# Patient Record
Sex: MALE | Race: WHITE | NOT HISPANIC OR LATINO | Employment: FULL TIME | URBAN - METROPOLITAN AREA
[De-identification: names, ages, dates, MRNs, and addresses within clinical notes are randomized per-mention and may not be internally consistent; named-entity substitution may affect disease eponyms.]

---

## 2018-04-04 ENCOUNTER — TRANSFERRED RECORDS (OUTPATIENT)
Dept: HEALTH INFORMATION MANAGEMENT | Facility: CLINIC | Age: 33
End: 2018-04-04

## 2018-04-23 ENCOUNTER — TRANSFERRED RECORDS (OUTPATIENT)
Dept: HEALTH INFORMATION MANAGEMENT | Facility: CLINIC | Age: 33
End: 2018-04-23

## 2018-05-06 ENCOUNTER — PRE VISIT (OUTPATIENT)
Dept: ORTHOPEDICS | Facility: CLINIC | Age: 33
End: 2018-05-06

## 2018-05-06 NOTE — TELEPHONE ENCOUNTER
FUTURE VISIT INFORMATION      FUTURE VISIT INFORMATION:    Date: 5/7/18    Time:     Location:   REFERRAL INFORMATION:    Referring provider:  Lisa Hilliard    Referring providers clinic:  [facility]    Reason for visit/diagnosis  Rt knee pain    RECORDS REQUESTED FROM:

## 2018-05-07 ENCOUNTER — OFFICE VISIT (OUTPATIENT)
Dept: ORTHOPEDICS | Facility: CLINIC | Age: 33
End: 2018-05-07
Payer: COMMERCIAL

## 2018-05-07 VITALS — HEIGHT: 71 IN | WEIGHT: 170 LBS | BODY MASS INDEX: 23.8 KG/M2

## 2018-05-07 DIAGNOSIS — M25.561 RIGHT KNEE PAIN, UNSPECIFIED CHRONICITY: Primary | ICD-10-CM

## 2018-05-07 NOTE — MR AVS SNAPSHOT
"              After Visit Summary   5/7/2018    Gus Guerrero    MRN: [id number]           Patient Information     Date Of Birth          1985        Visit Information        Provider Department      5/7/2018 7:50 AM Tree Omalley MD [facility]        Today's Diagnoses     Right knee pain, unspecified chronicity    -  1       Follow-ups after your visit        Additional Services     PHYSICAL THERAPY REFERRAL (Internal)       Physical Therapy Referral                  Who to contact     Please call your clinic at [phone] to:    Ask questions about your health    Make or cancel appointments    Discuss your medicines    Learn about your test results    Speak to your doctor            Additional Information About Your Visit        MyChart Information     [email or web address] gives you secure access to your electronic health record. If you see a primary care provider, you can also send messages to your care team and make appointments. If you have questions, please call your primary care clinic.  If you do not have a primary care provider, please call [phone] and they will assist you.      [email or web address] is an electronic gateway that provides easy, online access to your medical records. With [email or web address], you can request a clinic appointment, read your test results, renew a prescription or communicate with your care team.     To access your existing account, please contact your [facility] or call [phone] for assistance.        Care EveryWhere ID     This is your Care EveryWhere ID. This could be used by other organizations to access your [place] medical records  [id number]        Your Vitals Were     Height BMI (Body Mass Index)                1.803 m (5' 11\") 23.71 kg/m2           Blood Pressure from Last 3 Encounters:   08/21/15 125/81   08/15/14 117/78   07/22/14 118/80    Weight from Last 3 Encounters:   05/07/18 77.1 kg (170 lb)   08/21/15 74.8 kg (165 lb) "   08/21/15 75 kg (165 lb 6.4 oz)              We Performed the Following     PHYSICAL THERAPY REFERRAL (Internal)        Primary Care Provider Office Phone # Fax #    Harsha Menon [phone]       [facility] [address]  [facility] [address]        Equal Access to Services     [facility]: Hadii aad ku hadasho Soomaali, waaxda luqadaha, qaybta kaalmada adeegyada, waxay idiin hayaan adeeg kharash la'aan . So [facility] [phone].    ATENCIÓN: Si habla español, tiene a talbert disposición servicios gratuitos de asistencia lingüística. Oumar al [phone].    We comply with applicable federal civil rights laws and [place] laws. We do not discriminate on the basis of race, color, national origin, age, disability, sex, sexual orientation, or gender identity.            Thank you!     Thank you for choosing [facility]  for your care. Our goal is always to provide you with excellent care. Hearing back from our patients is one way we can continue to improve our services. Please take a few minutes to complete the written survey that you may receive in the mail after your visit with us. Thank you!             Your Updated Medication List - Protect others around you: Learn how to safely use, store and throw away your medicines at www.disposemymeds.org.          This list is accurate as of 5/7/18  9:06 AM.  Always use your most recent med list.                   Brand Name Dispense Instructions for use Diagnosis    ATIVAN 1 MG tablet   Generic drug:  LORazepam      Take 1 mg by mouth every 6 hours as needed.        citalopram 20 MG tablet    celeXA     Take 20 mg by mouth daily.        vitamin D 2000 units tablet      1 TABLET DAILY

## 2018-05-07 NOTE — NURSING NOTE
Reason For Visit:   Chief Complaint   Patient presents with     Consult     Right knee       ?  No  Occupation:   Currently working? Yes.  Work status?  Full time.  Date of injury: NA, bothersome for 4 weeks  Type of injury: NA.  Date of surgery: NA  Type of surgery: NA.  Smoker: No  Request smoking cessation information: No    Pain Assessment  Patient Currently in Pain: Yes  0-10 Pain Scale: 2  Primary Pain Location: Knee  Pain Orientation: Right

## 2018-05-07 NOTE — LETTER
5/7/2018       RE: Gus Guerrero  [address]  [facility] [address]     Dear Colleague,    Thank you for referring your patient, Gus Guerrero, to the [facility]TH[facility]IC at [facility]. Please see a copy of my visit note below.    CHIEF CONCERN: Right knee pain    HISTORY:   Pleasant 33-year-old male with a complex history regarding his knees.  He was previously diagnosed with acute lymphoblastic leukemia at approximately 14 years of age he underwent chemotherapy for this though he did have recurrence of his disease approximately 2 years later during the course of this time he had episodes of high dose steroids as well as radiation including full body radiation.  He had an area of avascular necrosis of his left knee treated with osteochondral allograft transplantation a 25 and an 18 mm plug this was done in 2009 this was done in the left knee and is been pretty happy with it.  He has been able to do things he wants to do it he describes a SANE score of 80.    In general he has not had problems with his right knee at all until approximately 4 weeks ago when he noted the onset of right knee pain.  It actually was very painful both 4 and 3 weeks ago and had really limited him and what he wants to do.  It bothered him the point where he was really having difficulty walking on it.  He got x-rays and MRI.  He presents to my clinic to discuss.  He admits that it has been better for the last 2 weeks.  He has not had an injection.  He has a SANE score of 75.    PAST MEDICAL HISTORY: (Reviewed with the patient and in the [facility] medical record)  1. Acute lymphoblastic leukemia as described above with a recurrence  2. History of high-dose steroids as well as radiation    PAST SURGICAL HISTORY: (Reviewed with the patient and in the [facility] medical record)  1. Osteochondral allograft transplantation medial femoral condyle left knee.  Doing well.  SANE score of 80    MEDICATIONS:  (Reviewed with the patient and in the [facility] medical record)    ALLERGIES: (Reviewed with the patient and in the [facility] medical record)  1. Biaxin      SOCIAL HISTORY: (Reviewed with the patient and in the medical record)  --Tobacco: None  --Occupation: He works as a   --Avocation/Sport: Enjoys spending time with friends, watching movies and being active.    FAMILY HISTORY: (Reviewed with the patient and in the medical record)  -- No family history of bleeding, clotting, or difficulty with anesthesia        REVIEW OF SYSTEMS: (Reviewed with the patient and on the health intake form)  -- A comprehensive 10 point review of systems was conducted and is negative except as noted in the HPI    EXAM:     General: Awake alert and oriented no acute distress    Body mass index is 23.71 kg/(m^2).    Left lower extremity :    Skin is intact with well-healed surgical incisions    No effusion    No tenderness    Full motion    No skin changes from the radiation that he experienced to this area, some atrophy    EHL/FHL/TA/GS 5/5    Sensation intact L3-S1    2+ Dorsalis Pedis Pulse     Right lower extremity:    Skin is intact with well-healed surgical incisions    1+ effusion    Medial and lateral diffuse tenderness no focal tenderness    Range of motion is 5-125.    Stable to varus valgus stress testing stable intra-posterior drawer testing    EHL/FHL/TA/GS 5/5    Sensation intact L3-S1    2+ Dorsalis Pedis Pulse      IMAGING:    Plain Radiographs: Plain films show evidence of prior avascular necrosis.    MRI: MRI shows a 1.5 to approximately 1.8 cm area of abnormal cartilage over the flexion zone of the medial femoral condyle this is consistent with avascular necrosis    ASSESSMENT:  1. Acute lymphoblastic leukemia, currently in remission  2. 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  Doing well.  3. Right knee pain and swelling for 4 weeks, seems to be  "improving    PLAN:  1. At this time we will start him on a scheduled naproxen as well as a course of physical therapy.  We will see how this does.  2. If he does not see lasting improvement he will return to my clinic for consideration of a corticosteroid injection  3. If this does not provide lasting benefit I think a staging arthroscopy to remove any unstable cartilage flaps, get the \"lay of the land\" would be a very reasonable plan that would fully he may require osteochondral allograft transplantation the medial femoral condyle on this knee if his remain symptomatic.  4. Follow with me as needed          Again, thank you for allowing me to participate in the care of your patient.      Sincerely,    Tree Omalley MD      "

## 2018-05-07 NOTE — PROGRESS NOTES
CHIEF CONCERN: Right knee pain    HISTORY:   Pleasant 33-year-old male with a complex history regarding his knees.  He was previously diagnosed with acute lymphoblastic leukemia at approximately 14 years of age he underwent chemotherapy for this though he did have recurrence of his disease approximately 2 years later during the course of this time he had episodes of high dose steroids as well as radiation including full body radiation.  He had an area of avascular necrosis of his left knee treated with osteochondral allograft transplantation a 25 and an 18 mm plug this was done in 2009 this was done in the left knee and is been pretty happy with it.  He has been able to do things he wants to do it he describes a SANE score of 80.    In general he has not had problems with his right knee at all until approximately 4 weeks ago when he noted the onset of right knee pain.  It actually was very painful both 4 and 3 weeks ago and had really limited him and what he wants to do.  It bothered him the point where he was really having difficulty walking on it.  He got x-rays and MRI.  He presents to my clinic to discuss.  He admits that it has been better for the last 2 weeks.  He has not had an injection.  He has a SANE score of 75.    PAST MEDICAL HISTORY: (Reviewed with the patient and in the EPIC medical record)  1. Acute lymphoblastic leukemia as described above with a recurrence  2. History of high-dose steroids as well as radiation    PAST SURGICAL HISTORY: (Reviewed with the patient and in the EPIC medical record)  1. Osteochondral allograft transplantation medial femoral condyle left knee.  Doing well.  SANE score of 80    MEDICATIONS: (Reviewed with the patient and in the EPIC medical record)    ALLERGIES: (Reviewed with the patient and in the EP[facility] medical record)  1. Biaxin      SOCIAL HISTORY: (Reviewed with the patient and in the medical record)  --Tobacco: None  --Occupation: He works as a financial  analyst  --Avocation/Sport: Enjoys spending time with friends, watching movies and being active.    FAMILY HISTORY: (Reviewed with the patient and in the medical record)  -- No family history of bleeding, clotting, or difficulty with anesthesia        REVIEW OF SYSTEMS: (Reviewed with the patient and on the health intake form)  -- A comprehensive 10 point review of systems was conducted and is negative except as noted in the HPI    EXAM:     General: Awake alert and oriented no acute distress    Body mass index is 23.71 kg/(m^2).    Left lower extremity :    Skin is intact with well-healed surgical incisions    No effusion    No tenderness    Full motion    No skin changes from the radiation that he experienced to this area, some atrophy    EHL/FHL/TA/GS 5/5    Sensation intact L3-S1    2+ Dorsalis Pedis Pulse     Right lower extremity:    Skin is intact with well-healed surgical incisions    1+ effusion    Medial and lateral diffuse tenderness no focal tenderness    Range of motion is 5-125.    Stable to varus valgus stress testing stable intra-posterior drawer testing    EHL/FHL/TA/GS 5/5    Sensation intact L3-S1    2+ Dorsalis Pedis Pulse      IMAGING:    Plain Radiographs: Plain films show evidence of prior avascular necrosis.    MRI: MRI shows a 1.5 to approximately 1.8 cm area of abnormal cartilage over the flexion zone of the medial femoral condyle this is consistent with avascular necrosis    ASSESSMENT:  1. Acute lymphoblastic leukemia, currently in remission  2. 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  Doing well.  3. Right knee pain and swelling for 4 weeks, seems to be improving    PLAN:  1. At this time we will start him on a scheduled naproxen as well as a course of physical therapy.  We will see how this does.  2. If he does not see lasting improvement he will return to my clinic for consideration of a corticosteroid injection  3. If this  "does not provide lasting benefit I think a staging arthroscopy to remove any unstable cartilage flaps, get the \"lay of the land\" would be a very reasonable plan that would fully he may require osteochondral allograft transplantation the medial femoral condyle on this knee if his remain symptomatic.  4. Follow with me as needed        "

## 2018-05-18 ENCOUNTER — THERAPY VISIT (OUTPATIENT)
Dept: PHYSICAL THERAPY | Facility: CLINIC | Age: 33
End: 2018-05-18
Payer: COMMERCIAL

## 2018-05-18 DIAGNOSIS — M25.561 ACUTE PAIN OF RIGHT KNEE: Primary | ICD-10-CM

## 2018-05-18 PROCEDURE — 97110 THERAPEUTIC EXERCISES: CPT | Mod: GP | Performed by: PHYSICAL THERAPIST

## 2018-05-18 PROCEDURE — 97161 PT EVAL LOW COMPLEX 20 MIN: CPT | Mod: GP | Performed by: PHYSICAL THERAPIST

## 2018-05-18 ASSESSMENT — ACTIVITIES OF DAILY LIVING (ADL)
SWELLING: I HAVE THE SYMPTOM BUT IT DOES NOT AFFECT MY ACTIVITY
GO UP STAIRS: ACTIVITY IS NOT DIFFICULT
PAIN: I DO NOT HAVE THE SYMPTOM
WALK: ACTIVITY IS MINIMALLY DIFFICULT
HOW_WOULD_YOU_RATE_THE_CURRENT_FUNCTION_OF_YOUR_KNEE_DURING_YOUR_USUAL_DAILY_ACTIVITIES_ON_A_SCALE_FROM_0_TO_100_WITH_100_BEING_YOUR_LEVEL_OF_KNEE_FUNCTION_PRIOR_TO_YOUR_INJURY_AND_0_BEING_THE_INABILITY_TO_PERFORM_ANY_OF_YOUR_USUAL_DAILY_ACTIVITIES?: 90
GIVING WAY, BUCKLING OR SHIFTING OF KNEE: I DO NOT HAVE THE SYMPTOM
KNEE_ACTIVITY_OF_DAILY_LIVING_SCORE: 85.71
AS_A_RESULT_OF_YOUR_KNEE_INJURY,_HOW_WOULD_YOU_RATE_YOUR_CURRENT_LEVEL_OF_DAILY_ACTIVITY?: NEARLY NORMAL
SIT WITH YOUR KNEE BENT: ACTIVITY IS NOT DIFFICULT
RAW_SCORE: 60
LIMPING: I DO NOT HAVE THE SYMPTOM
HOW_WOULD_YOU_RATE_THE_OVERALL_FUNCTION_OF_YOUR_KNEE_DURING_YOUR_USUAL_DAILY_ACTIVITIES?: NEARLY NORMAL
WEAKNESS: I DO NOT HAVE THE SYMPTOM
GO DOWN STAIRS: ACTIVITY IS MINIMALLY DIFFICULT
KNEE_ACTIVITY_OF_DAILY_LIVING_SUM: 60
STIFFNESS: I DO NOT HAVE THE SYMPTOM
SQUAT: ACTIVITY IS SOMEWHAT DIFFICULT
STAND: ACTIVITY IS NOT DIFFICULT
RISE FROM A CHAIR: ACTIVITY IS SOMEWHAT DIFFICULT
KNEEL ON THE FRONT OF YOUR KNEE: ACTIVITY IS FAIRLY DIFFICULT

## 2018-05-18 NOTE — MR AVS SNAPSHOT
After Visit Summary   5/18/2018    Gus Guerrero    MRN: [id number]           Patient Information     Date Of Birth          1985        Visit Information        Provider Department      5/18/2018 4:30 PM Jaciel Garnett, PT [facility] Physical Therapy        Today's Diagnoses     Acute pain of right knee    -  1       Follow-ups after your visit        Who to contact     If you have questions or need follow up information about today's clinic visit or your schedule please contact [facility] ATHLETIC [facility] PHYSICAL [facility] directly at [phone].  Normal or non-critical lab and imaging results will be communicated to you by [employer], letter or phone within 4 business days after the clinic has received the results. If you do not hear from us within 7 days, please contact the clinic through [employer] or phone. If you have a critical or abnormal lab result, we will notify you by phone as soon as possible.  Submit refill requests through [employer] or call your pharmacy and they will forward the refill request to us. Please allow 3 business days for your refill to be completed.          Additional Information About Your Visit        MyChart Information     [employer] gives you secure access to your electronic health record. If you see a primary care provider, you can also send messages to your care team and make appointments. If you have questions, please call your primary care clinic.  If you do not have a primary care provider, please call [phone] and they will assist you.        Care EveryWhere ID     This is your Care EveryWhere ID. This could be used by other organizations to access your [place] medical records  [id number]         Blood Pressure from Last 3 Encounters:   08/21/15 125/81   08/15/14 117/78   07/22/14 118/80    Weight from Last 3 Encounters:   05/07/18 77.1 kg (170 lb)   08/21/15 74.8 kg (165 lb)   08/21/15 75 kg (165 lb 6.4  oz)              We Performed the Following     HC PT EVAL, LOW COMPLEXITY     HINA INITIAL EVAL REPORT     THERAPEUTIC EXERCISES        Primary Care Provider Office Phone # Fax #    Harsha Menon [phone]       [facility] [address]  [facility] [address]        Equal Access to Services     [facility] GIOVANNA : Hadii aad ku hadasho Soomaali, waaxda luqadaha, qaybta kaalmada adeegyada, waxay idiin hayaan adeeg khannemariesh la'aan . So [facility] [phone].    ATENCIÓN: Si habla español, tiene a talbert disposición servicios gratuitos de asistencia lingüística. [facility] [phone].    We comply with applicable federal civil rights laws and [place] laws. We do not discriminate on the basis of race, color, national origin, age, disability, sex, sexual orientation, or gender identity.            Thank you!     Thank you for choosing [place] FOR ATHLETIC MEDICINE [facility] PHYSICAL THERAPY  for your care. Our goal is always to provide you with excellent care. Hearing back from our patients is one way we can continue to improve our services. Please take a few minutes to complete the written survey that you may receive in the mail after your visit with us. Thank you!             Your Updated Medication List - Protect others around you: Learn how to safely use, store and throw away your medicines at www.disposemymeds.org.          This list is accurate as of 5/18/18  5:11 PM.  Always use your most recent med list.                   Brand Name Dispense Instructions for use Diagnosis    ATIVAN 1 MG tablet   Generic drug:  LORazepam      Take 1 mg by mouth every 6 hours as needed.        citalopram 20 MG tablet    celeXA     Take 20 mg by mouth daily.        vitamin D 2000 units tablet      1 TABLET DAILY

## 2018-05-18 NOTE — PROGRESS NOTES
[facility] Initial Evaluation  Subjective:  Patient is a 33 year old male presenting with rehab right knee hpi.   Gus Guerrero is a 33 year old male with a right knee condition.    Condition occurred: for unknown reasons.  This is a new condition  Pt reports that about eight weeks ago started noticing popping in his R knee around early April 2018. Knee began to swell and felt stiff and sore. Went into urgent care. Was concerned about recurrence of lymphoblastic leukemia which had previously affected his L knee. Pain in R got to the point where he had to be on crutches. Pain has decreased a bit, but saw Dr. Omalley, discussed MRI that showed avascular necrosis and discussed options. Trying PT first. Currently reports few symptoms, maybe a little stiffness descending stairs. .    Patient reports pain:  Anterior, medial and in the joint.    Pain is described as sharp and aching and is intermittent and reported as 1/10.   Pain is the same all the time.  Symptoms are exacerbated by activity and descending stairs and relieved by rest.  Since onset symptoms are gradually improving.  Special tests:  MRI and x-ray.      General health as reported by patient is excellent.  Pertinent medical history includes:  Cancer and high blood pressure.    Other surgeries include:  Cancer surgery ([id number] acute lymphblastic leukemia, 2009 L knee graft).  Current medications:  Anti-depressants.  Current occupation is .    Primary job tasks include:  Prolonged sitting, prolonged standing, lifting and repetitive tasks.    Barriers include:  None as reported by the patient.    Red flags:  None as reported by the patient.                        Objective:  System                                                Knee Evaluation:  ROM:  Strength:  Normal  AROM    Hyperextension: Left:  7    Right:  Extension: Left:    Right:  4  Flexion: Left: 133    Right: 131            Special Tests: Not  Assessed            Functional Testing:          Quad:    Single Leg Squat:  Left:      Right:        Bilateral Leg Squat:   Excessive anterior knee excursion and decreased hip/trunk flexion              General     ROS    Assessment/Plan:    Patient is a 33 year old male with right side knee complaints.    Patient has the following significant findings with corresponding treatment plan.                Diagnosis 1:  R knee pain/avascular necrosis  Pain -  self management, education and home program  Decreased ROM/flexibility - manual therapy, therapeutic exercise, therapeutic activity and home program  Decreased joint mobility - manual therapy, therapeutic exercise, therapeutic activity and home program  Decreased proprioception - neuro re-education, therapeutic activities and home program  Inflammation - self management/home program  Decreased function - therapeutic activities and home program    Therapy Evaluation Codes:   1) History comprised of:   Personal factors that impact the plan of care:      None.    Comorbidity factors that impact the plan of care are:      None.     Medications impacting care: None.  2) Examination of Body Systems comprised of:   Body structures and functions that impact the plan of care:      Knee.   Activity limitations that impact the plan of care are:      Stairs.  3) Clinical presentation characteristics are:   Evolving/Changing.  4) Decision-Making    Low complexity using standardized patient assessment instrument and/or measureable assessment of functional outcome.  Cumulative Therapy Evaluation is: Low complexity.    Previous and current functional limitations:  (See Goal Flow Sheet for this information)    Short term and Long term goals: (See Goal Flow Sheet for this information)     Communication ability:  Patient appears to be able to clearly communicate and understand verbal and written communication and follow directions correctly.  Treatment Explanation - The following has  been discussed with the patient:   RX ordered/plan of care  Anticipated outcomes  Possible risks and side effects  This patient would benefit from PT intervention to resume normal activities.   Rehab potential is excellent.    Frequency:  1 X week, once daily  Duration:  for 6 weeks  Discharge Plan:  Achieve all LTG.  Independent in home treatment program.  Reach maximal therapeutic benefit.    Please refer to the daily flowsheet for treatment today, total treatment time and time spent performing 1:1 timed codes.

## 2018-07-20 PROBLEM — M25.561 ACUTE PAIN OF RIGHT KNEE: Status: RESOLVED | Noted: 2018-05-18 | Resolved: 2018-07-20

## 2019-07-09 DIAGNOSIS — Z91.89 AT RISK FOR CARDIOMYOPATHY: ICD-10-CM

## 2019-07-09 DIAGNOSIS — Z92.3 HISTORY OF RADIATION THERAPY: ICD-10-CM

## 2019-07-09 DIAGNOSIS — Z85.6 HISTORY OF ACUTE LYMPHOID LEUKEMIA: Primary | ICD-10-CM

## 2019-07-09 DIAGNOSIS — Z92.21 STATUS POST CHEMOTHERAPY: ICD-10-CM

## 2019-07-09 DIAGNOSIS — Z94.81 STATUS POST BONE MARROW TRANSPLANT (H): ICD-10-CM

## 2019-08-09 ENCOUNTER — TRANSFERRED RECORDS (OUTPATIENT)
Dept: HEALTH INFORMATION MANAGEMENT | Facility: CLINIC | Age: 34
End: 2019-08-09

## 2019-08-27 ENCOUNTER — ANCILLARY PROCEDURE (OUTPATIENT)
Dept: CARDIOLOGY | Facility: CLINIC | Age: 34
End: 2019-08-27
Attending: NURSE PRACTITIONER
Payer: COMMERCIAL

## 2019-08-27 ENCOUNTER — OFFICE VISIT (OUTPATIENT)
Dept: PEDIATRIC HEMATOLOGY/ONCOLOGY | Facility: CLINIC | Age: 34
End: 2019-08-27
Attending: NURSE PRACTITIONER
Payer: COMMERCIAL

## 2019-08-27 VITALS
OXYGEN SATURATION: 100 % | DIASTOLIC BLOOD PRESSURE: 78 MMHG | HEIGHT: 70 IN | HEART RATE: 67 BPM | BODY MASS INDEX: 24.65 KG/M2 | WEIGHT: 172.18 LBS | RESPIRATION RATE: 20 BRPM | SYSTOLIC BLOOD PRESSURE: 128 MMHG

## 2019-08-27 DIAGNOSIS — Z92.3 HISTORY OF RADIATION THERAPY: ICD-10-CM

## 2019-08-27 DIAGNOSIS — Z85.6 HISTORY OF ACUTE LYMPHOID LEUKEMIA: ICD-10-CM

## 2019-08-27 DIAGNOSIS — Z94.81 STATUS POST BONE MARROW TRANSPLANT (H): ICD-10-CM

## 2019-08-27 DIAGNOSIS — Z91.89 AT RISK FOR CARDIOMYOPATHY: ICD-10-CM

## 2019-08-27 DIAGNOSIS — Z92.21 STATUS POST CHEMOTHERAPY: ICD-10-CM

## 2019-08-27 DIAGNOSIS — Z85.6 HISTORY OF ACUTE LYMPHOID LEUKEMIA: Primary | ICD-10-CM

## 2019-08-27 LAB
ALBUMIN SERPL-MCNC: 4.1 G/DL (ref 3.4–5)
ALP SERPL-CCNC: 96 U/L (ref 40–150)
ALT SERPL W P-5'-P-CCNC: 29 U/L (ref 0–70)
ANION GAP SERPL CALCULATED.3IONS-SCNC: 7 MMOL/L (ref 3–14)
AST SERPL W P-5'-P-CCNC: 16 U/L (ref 0–45)
BILIRUB SERPL-MCNC: 0.8 MG/DL (ref 0.2–1.3)
BUN SERPL-MCNC: 20 MG/DL (ref 7–30)
CALCIUM SERPL-MCNC: 9.7 MG/DL (ref 8.5–10.1)
CHLORIDE SERPL-SCNC: 107 MMOL/L (ref 94–109)
CO2 SERPL-SCNC: 25 MMOL/L (ref 20–32)
CREAT SERPL-MCNC: 1.16 MG/DL (ref 0.66–1.25)
GFR SERPL CREATININE-BSD FRML MDRD: 83 ML/MIN/{1.73_M2}
GLUCOSE SERPL-MCNC: 86 MG/DL (ref 70–99)
POTASSIUM SERPL-SCNC: 3.6 MMOL/L (ref 3.4–5.3)
PROT SERPL-MCNC: 7.8 G/DL (ref 6.8–8.8)
SODIUM SERPL-SCNC: 139 MMOL/L (ref 133–144)
T4 FREE SERPL-MCNC: 0.86 NG/DL (ref 0.76–1.46)
TSH SERPL DL<=0.005 MIU/L-ACNC: 1.82 MU/L (ref 0.4–4)

## 2019-08-27 PROCEDURE — 36415 COLL VENOUS BLD VENIPUNCTURE: CPT | Performed by: NURSE PRACTITIONER

## 2019-08-27 PROCEDURE — 84439 ASSAY OF FREE THYROXINE: CPT | Performed by: NURSE PRACTITIONER

## 2019-08-27 PROCEDURE — 80053 COMPREHEN METABOLIC PANEL: CPT | Performed by: NURSE PRACTITIONER

## 2019-08-27 PROCEDURE — 84443 ASSAY THYROID STIM HORMONE: CPT | Performed by: NURSE PRACTITIONER

## 2019-08-27 PROCEDURE — G0463 HOSPITAL OUTPT CLINIC VISIT: HCPCS | Mod: ZF

## 2019-08-27 RX ORDER — ASPIRIN 81 MG/1
81 TABLET ORAL DAILY
COMMUNITY

## 2019-08-27 ASSESSMENT — MIFFLIN-ST. JEOR: SCORE: 1740.38

## 2019-08-27 ASSESSMENT — PAIN SCALES - GENERAL: PAINLEVEL: NO PAIN (0)

## 2019-08-27 NOTE — LETTER
8/27/2019      RE: Gus Guerrero  [address]  [facility] [address]       We had the pleasure of seeing your patient, Gus Guerrero, at the [facility] for childhood cancer survivors. The following is a summary of your patient's cancer, therapy, current history and physical findings followed by an assessment and long-term follow-up evaluation.   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 He was high risk due to his age. He was treated at the [facility] on Protocol [id number] Regimen A and completed his initial therapy on May 6, 2002. He received the following chemotherapy for his initial regimen.   1. Cyclophosphamide intravenously with a cumulative dose of 3 gm/M2   2. Cytarabine intravenously with a cumulative dose of 2100 mg/M2   3. Cytarabine intrathecally with a cumulative dose of 47 mg/M2   4. Daunorubicin intravenously with a cumulative dose of 100 mg/M2 (anthracycline equivalent 83 mg/M2)   5. Dexamethasone orally   6. Doxorubicin intravenously with a cumulative dose of 75 mg/M2   7. L-asparaginase intramuscularly   8. 6-mercaptopurine orally   9. Methotrexate orally   10. Methotrexate intrathecally with a cumulative dose of 192 mg/M2   11. Prednisone orally   12. 6-thioguanine orally   13. Vincristine intravenously   Unfortunately, Gus was found to have a recurrence of his disease approximately two years after therapy was completed on March 24, 2004 to his bone marrow. He was treated with a relapse regimen [id number] Regimen A which was completed with his bone marrow transplantation. He received the following chemotherapy on Regimen [id number].   1. Cyclophosphamide intravenously with a cumulative dose of 2.2 gm/M2   2. Cytarabine intravenously with a cumulative dose of 24 g/M2   3. Cytarabine  intrathecally with a cumulative dose of 41 mg/M2   4. Doxorubicin intravenously with a cumulative dose of 60 mg/M2   5. L-asparaginase intramuscularly   6. Methotrexate intravenously with a cumulative dose of 5 g/M2   7. Methotrexate intrathecally with a cumulative dose of 28 mg/M2   8. PEG asparaginase intramuscularly   9. Prednisone orally   10. Vincristine intravenously   11. 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 He received the following conditioning regimen for his transplant:   1. Cyclophosphamide intravenously with a cumulative dose of 4371 mg/M2   2. Fludarabine intravenously with a cumulative dose of 78 mg/M2   3. Total body irradiation which started on July 24, 2004 and was completed on July 27, 2004 in eight fractions at 165 cGy/fraction for a total dose of 1320 cGy.   4. Testicular boost completed on July 24, 2004 in two fractions at 200 cGy/fraction for a total dose of 400 cGy.   Gus had GVHD prophylaxis with cyclosporin and MMF and steroids. He was off all immune suppression in June of 2005.   Gus's acute and chronic late effects known to date include:   1. 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   2. 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   3. Mild air trapping via PFTs July 2005 with no current symptoms.   4. High triglycerides found in February 2008.   5. Problems with sustained attention, executive functioning and processing speed initially seen in August of 2005 which were improved on recent neuropsychological testing in 2009.   6. Osteonecrosis of the left medial femoral condyle found on May 19, 2008.   7. Compound nevus resected on September 5, 2008.   8.  Anxiety    Current Medications:   Current Outpatient Medications   Medication     aspirin 81 MG EC tablet     citalopram (CELEXA) 20 MG tablet     VITAMIN  D 2000 UNIT PO TABS     LORazepam (ATIVAN) 1 MG tablet     No current facility-administered medications for this visit.      Gus has medication allergies to Bactrim, Biaxin, Serevent, Nuts, and Dapsone    History of Present Illness: Gus presents to the visit today alone.  His last visit with us was in August 2015.  He has been doing well since his last visit.  He has a 2 year old son now after fertility treatments in [place].  They are going back to try for a second child in the fall of this year.     No recent medical issues.   He wears sunscreen regularly.    No problems with his vision.  He sees the eye dr every 2 years for cataract follow up.   He sees orthopedics every other year for his left knee AVN.  He was seen last year by Dr. Omalley when he had a knee pain exacerbation.  He started PT and OTC meds and this pain resolved.       No dry skin, mouth, or eyes.  He is on Celexa 10 mg every day for anxiety.  He feels like it is helping.  He is no longer having anxiety attack symptoms like he was before.  No significant illnesses or fevers.  Gets the flu vaccine every year.  Has been seen by derm yearly. Had a dysplastic mole removed from his back last year.  Seeing them later today.     Review of systems:    General:  No acute distress  Skin: history of dysplastic mole.  Eyes: cataracts; followed  by eye   Ears/Nose/Throat: negative  Respiratory: No SOB or increased WOB; no cough or wheezing  Cardiovascular: negative for palpitations or chest pain  Gastrointestinal: negative; no pain; No N/V/D/C  Genitourinary: negative  Musculoskeletal: left knee osteonecrosis s/p surgery 2009  Neurologic: negative  Psychiatric:  Anxiety- improved  Hematologic/Lymphatic/Immunologic: negative  Endocrine: negative    Social History: Gus completed his undergraduate degree at [employer] and was accepted to the [employer] for a combined master's degree in health policy administration and business  "administration.  He has completed his Masters degree.  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    Gus is recently .  He denies any risky behaviors in the area of tobacco or alcohol or recreational drug use. He has banked sperm.      Family History: Gus's paternal aunt has a history of basal cell carcinoma. Paternal grandmother with a history of ovarian cancer at the age of 81. Paternal grandfather also with skin cancer in the area of basal cell carcinoma. Gus's father has a history of hypertension and high cholesterol and has had multiple stents placed. His maternal grandfather has a history of heart disease as well. His paternal grandmother has a pacemaker. His paternal grandmother also has diabetes which is controlled with diet.  Paternal 1st cousin with leukemia (remission).  No change.    Physical Examination: /78 (BP Location: Left arm, Patient Position: Fowlers, Cuff Size: Adult Large)   Pulse 67   Resp 20   Ht 1.783 m (5' 10.2\")   Wt 78.1 kg (172 lb 2.9 oz)   SpO2 100%   BMI 24.57 kg/m       In general, Gus appears to be in no acute distress. He is normocephalic/atraumatic. His sclera are anicteric. Pupils are equally round and reactive to light and accommodation. Extraocular movements are intact. Funduscopic exam reveal moderate bilateral posterior subcapsular cataracts. Bilateral tympanic membranes are within normal limits. The oropharynx is clear without lesions. Neck is supple. Thyroid non-palpable.   Lungs clear to auscultation bilaterally. The heart is of regular rate and rhythm with no murmurs, rubs or gallops. The abdomen is non-distended, non-tender and soft. No hepatosplenomegaly or palpable masses are noted.    exam deferred.  Musculoskeletal exam reveals gait within normal limits.  Muscular strength 5/5 throughout the lower extremities, no edema or effusion noted to the patient's left knee. Skin exam reveals multiple striae to the patient's abdomen.  Flesh colored " 3mm lesion above right ear, no drainage. There are small atypical nevi to the patient's abdomen. Patient has no palpable cervical, axillary or inguinal lymphadenopathy.     Laboratory Studies:   Results for orders placed or performed in visit on 08/27/19   TSH   Result Value Ref Range    TSH 1.82 0.40 - 4.00 mU/L   T4 free   Result Value Ref Range    T4 Free 0.86 0.76 - 1.46 ng/dL   Comprehensive metabolic panel   Result Value Ref Range    Sodium 139 133 - 144 mmol/L    Potassium 3.6 3.4 - 5.3 mmol/L    Chloride 107 94 - 109 mmol/L    Carbon Dioxide 25 20 - 32 mmol/L    Anion Gap 7 3 - 14 mmol/L    Glucose 86 70 - 99 mg/dL    Urea Nitrogen 20 7 - 30 mg/dL    Creatinine 1.16 0.66 - 1.25 mg/dL    GFR Estimate 83 >60 mL/min/[1.73_m2]    GFR Estimate If Black >90 >60 mL/min/[1.73_m2]    Calcium 9.7 8.5 - 10.1 mg/dL    Bilirubin Total 0.8 0.2 - 1.3 mg/dL    Albumin 4.1 3.4 - 5.0 g/dL    Protein Total 7.8 6.8 - 8.8 g/dL    Alkaline Phosphatase 96 40 - 150 U/L    ALT 29 0 - 70 U/L    AST 16 0 - 45 U/L       Assessment, Plan and Long-Term Follow-Up Recommendations: Gus Guerrero is a now 34-year-old  male with a history of acute lymphoblastic leukemia that was status post bone marrow transplant approximately 15 years ago. The following are our late effects recommendations.   1. Risk of recurrence: Gus is currently 15 years post BMT for his ALL. Given the distance from the diagnosis of his primary malignancy, the likelihood of recurrence at this point is low. Will have CBC in the near future.  Only needs to be repeated as clinically indicated.  2. Psychosocial effects, learning and memory: Gus was previously found to have neurocognitive difficulties in the area of sustained attention, executive functioning and processing speed. He had repeat neuropsychological testing in 2009 in which these areas were improved. Gus appears to be functioning well and will be continuing grad school through next Spring.  Should he  notice any changes in his neurocognitive function then we will recommend repeat neuropsychological testing to be completed at that time. His anxiety is improved with being back on his meds.  He is seeing his PCP for regular follow up.  We also discussed that some pts can have PTSD after cancer therapy, but his seemed more generalized.  3. Risk for cardiac toxicity: Gus was treated with anthracycline chemotherapy as well as TBI which predispose him to late cardiac dysfunction. There have also been recent studies of metabolic syndromes in childhood cancer survivors which found that BMT survivors may be predisposed to early heart disease as well. Gus was found to have high triglycerides and cholesterol during this study.  Recent lipid panel was elevated.  I discussed diet changes for triglycerides and I would recommend that he discuss statin use with his PCP since his LDL is over 160.  Gus had a normal echocardiogram today and we recommend repeat followup for his in approximately 5 years. Gus also had other extensive testing given his past symptoms which have been negative.    4. Risk for kidney damage: Gus was exposed to multiple chemotherapeutic agents and GVHD prophylactic agents which can cause renal dysfunction. Creatinine was mildly elevated at last check but normal this year.  We discussed refraining from protein shakes. We would recommend that he keep himself very well hydrated and try to avoid long term NSAID use.   5. Male issues related to fertility: Gus was treated with a large amount of alkylating agent chemotherapy as well as local testicular radiation. This has caused him to have some degree of seminiferous tubule dysfunction. We are still monitoring for leydig cell dysfunction as this can change over time. As previously found when he was enrolled in the metabolic syndrome study for childhood cancer survivors in 2008, Gus had an elevated FSH. He most recently had semen analysis completed in 2009  which did show he had no progressively motile sperm. He does have sperm banked at a cryo bank to be used in the future should he be interested in conceiving. They had one child via IVF and will be planning for another procedure this fall.  6. Risk for secondary malignancy: Because of his prior treatment with radiation therapy, Gus may be at increased risk for developing secondary malignancies. He has a few atypical-appearing nevi that are small on his abdomen that would continue to require monitoring.  I recommended that he have a full skin exam with dermatology.   We also recommended that Gus do monthly self testicular exams due to his previous radiation. Gus should also have a thyroid exam completed yearly due to his TBI.   Exam today is normal. I also discussed the updated guidelines that recommend patients that were exposed to any abdominal directed radiation have early colon cancer screening starting at 30 years old.  We discussed the options of colonoscopy every 5 years vs cologuard every 3 years.  He would prefer colonoscopy.  He will discuss a recommended center with his PCP if they don't have a preferred center then we will request the procedure to be completed here with our gastroenterology department.  7. Risk for bone damage: Gus was previously treated with multiple steroids for his transplant which can predispose him to bone density difficulties. He had a DEXA scan completed in 2008 that was within normal limits. Repeat as needed.  8. Risk for eye toxicity: Because of Gus's exposure to total body irradiation, he is at increased risk for developing cataracts. Bilateral cataracts were noted on today's exam. It is recommended that Gus see an ophthalmologist every 1-2 years for continued evaluation and monitoring.   9. Immune status: Gus was previously immunized for hepatitis B, however, per his mother's recollection she does not believe his titers ever produced an immune response. Currently Gus is  not immune to hepatitis B since his surface antibody was completely negative. If Gus has received the hepatitis B series on two separate occasions and the antibody is negative then likely he is just not going to respond to the vaccine. Should he have an expected exposure to hepatitis B then he should see his primary care physician for treatment recommendations.   10. Risk for thyroid damage: Gus received radiation therapy to his thyroid area which can predispose him to hypothyroidism. We would recommend he have thyroid function assessed yearly along with a physical exam. His thyroid function today was normal.  11. Osteonecrosis: Gus was previously found to have osteonecrosis of his left knee and is followed by Orthopedics in [place]. We recommend that he continue followup with their clinic. He currently does not appear to have any great functional limitations to his left lower extremity.  He is taking regular vitamin D supplements.        It was great to see Gus in the [facility] clinic today.  We appreciate the opportunity to participate in your patient's care. If you have any questions or concerns please do not hesitate to contact us at [phone]. We ask that Gus return to our clinic in 2 years for follow up.  I asked that he see his PCP for a yearly physical if he is coming here every other year.      Lisa Hilliard MSN, APRN, CPNP-AC, CPON  Department of Pediatrics  Division of Hematology/Oncology

## 2019-08-27 NOTE — NURSING NOTE
"Chief Complaint   Patient presents with     RECHECK     Patient is here today for ALL follow up     /78 (BP Location: Left arm, Patient Position: Fowlers, Cuff Size: Adult Large)   Pulse 67   Resp 20   Ht 1.783 m (5' 10.2\")   Wt 78.1 kg (172 lb 2.9 oz)   SpO2 100%   BMI 24.57 kg/m      Shantal Clarke LPN  August 27, 2019  "

## 2019-08-27 NOTE — PROGRESS NOTES
We had the pleasure of seeing your patient, Gus Guerrero, at the [facility] for childhood cancer survivors. The following is a summary of your patient's cancer, therapy, current history and physical findings followed by an assessment and long-term follow-up evaluation.   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 He was high risk due to his age. He was treated at the [facility] on Protocol [id number] Regimen A and completed his initial therapy on May 6, 2002. He received the following chemotherapy for his initial regimen.   1. Cyclophosphamide intravenously with a cumulative dose of 3 gm/M2   2. Cytarabine intravenously with a cumulative dose of 2100 mg/M2   3. Cytarabine intrathecally with a cumulative dose of 47 mg/M2   4. Daunorubicin intravenously with a cumulative dose of 100 mg/M2 (anthracycline equivalent 83 mg/M2)   5. Dexamethasone orally   6. Doxorubicin intravenously with a cumulative dose of 75 mg/M2   7. L-asparaginase intramuscularly   8. 6-mercaptopurine orally   9. Methotrexate orally   10. Methotrexate intrathecally with a cumulative dose of 192 mg/M2   11. Prednisone orally   12. 6-thioguanine orally   13. Vincristine intravenously   Unfortunately, Gus was found to have a recurrence of his disease approximately two years after therapy was completed on March 24, 2004 to his bone marrow. He was treated with a relapse regimen [id number] Regimen A which was completed with his bone marrow transplantation. He received the following chemotherapy on Regimen [id number].   1. Cyclophosphamide intravenously with a cumulative dose of 2.2 gm/M2   2. Cytarabine intravenously with a cumulative dose of 24 g/M2   3. Cytarabine intrathecally with a cumulative dose of 41 mg/M2   4. Doxorubicin intravenously  with a cumulative dose of 60 mg/M2   5. L-asparaginase intramuscularly   6. Methotrexate intravenously with a cumulative dose of 5 g/M2   7. Methotrexate intrathecally with a cumulative dose of 28 mg/M2   8. PEG asparaginase intramuscularly   9. Prednisone orally   10. Vincristine intravenously   11. 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 He received the following conditioning regimen for his transplant:   1. Cyclophosphamide intravenously with a cumulative dose of 4371 mg/M2   2. Fludarabine intravenously with a cumulative dose of 78 mg/M2   3. Total body irradiation which started on July 24, 2004 and was completed on July 27, 2004 in eight fractions at 165 cGy/fraction for a total dose of 1320 cGy.   4. Testicular boost completed on July 24, 2004 in two fractions at 200 cGy/fraction for a total dose of 400 cGy.   Gus had GVHD prophylaxis with cyclosporin and MMF and steroids. He was off all immune suppression in June of 2005.   Gus's acute and chronic late effects known to date include:   1. 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   2. 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   3. Mild air trapping via PFTs July 2005 with no current symptoms.   4. High triglycerides found in February 2008.   5. Problems with sustained attention, executive functioning and processing speed initially seen in August of 2005 which were improved on recent neuropsychological testing in 2009.   6. Osteonecrosis of the left medial femoral condyle found on May 19, 2008.   7. Compound nevus resected on September 5, 2008.   8.  Anxiety    Current Medications:   Current Outpatient Medications   Medication     aspirin 81 MG EC tablet     citalopram (CELEXA) 20 MG tablet     VITAMIN D 2000 UNIT PO TABS     LORazepam (ATIVAN) 1 MG tablet     No current  facility-administered medications for this visit.      Gus has medication allergies to Bactrim, Biaxin, Serevent, Nuts, and Dapsone    History of Present Illness: Gus presents to the visit today alone.  His last visit with us was in August 2015.  He has been doing well since his last visit.  He has a 2 year old son now after fertility treatments in [place].  They are going back to try for a second child in the fall of this year.     No recent medical issues.   He wears sunscreen regularly.    No problems with his vision.  He sees the eye dr every 2 years for cataract follow up.   He sees orthopedics every other year for his left knee AVN.  He was seen last year by Dr. Omalley when he had a knee pain exacerbation.  He started PT and OTC meds and this pain resolved.       No dry skin, mouth, or eyes.  He is on Celexa 10 mg every day for anxiety.  He feels like it is helping.  He is no longer having anxiety attack symptoms like he was before.  No significant illnesses or fevers.  Gets the flu vaccine every year.  Has been seen by derm yearly. Had a dysplastic mole removed from his back last year.  Seeing them later today.     Review of systems:    General:  No acute distress  Skin: history of dysplastic mole.  Eyes: cataracts; followed  by eye   Ears/Nose/Throat: negative  Respiratory: No SOB or increased WOB; no cough or wheezing  Cardiovascular: negative for palpitations or chest pain  Gastrointestinal: negative; no pain; No N/V/D/C  Genitourinary: negative  Musculoskeletal: left knee osteonecrosis s/p surgery 2009  Neurologic: negative  Psychiatric:  Anxiety- improved  Hematologic/Lymphatic/Immunologic: negative  Endocrine: negative    Social History: Gus completed his undergraduate degree at [employer] and was accepted to the [employer] for a combined master's degree in health policy administration and business administration.  He has completed his Masters degree.  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    Gus is recently .  He denies any risky behaviors in the area of tobacco or alcohol or recreational drug use. He has banked sperm.      Family History: Gus's paternal aunt has a history of basal cell carcinoma. Paternal grandmother with a history of ovarian cancer at the age of 81. Paternal grandfather also with skin cancer in the area of basal cell carcinoma. Gus's father has a history of hypertension and high cholesterol and has had multiple stents placed. His maternal grandfather has a history of heart disease as well. His paternal grandmother has a pacemaker. His paternal grandmother also has diabetes which is controlled with diet.  Paternal 1st cousin with leukemia (remission).  No change.    Physical Examination: /78 (BP Location: Left arm, Patient Position: Fowlers, Cuff Size: Adult Large)   Pulse 67   Resp 20   Ht 1.783 m (5' 10.2\")   Wt 78.1 kg (172 lb 2.9 oz)   SpO2 100%   BMI 24.57 kg/m      In general, Gus appears to be in no acute distress. He is normocephalic/atraumatic. His sclera are anicteric. Pupils are equally round and reactive to light and accommodation. Extraocular movements are intact. Funduscopic exam reveal moderate bilateral posterior subcapsular cataracts. Bilateral tympanic membranes are within normal limits. The oropharynx is clear without lesions. Neck is supple. Thyroid non-palpable.   Lungs clear to auscultation bilaterally. The heart is of regular rate and rhythm with no murmurs, rubs or gallops. The abdomen is non-distended, non-tender and soft. No hepatosplenomegaly or palpable masses are noted.    exam deferred.  Musculoskeletal exam reveals gait within normal limits.  Muscular strength 5/5 throughout the lower extremities, no edema or effusion noted to the patient's left knee. Skin exam reveals multiple striae to the patient's abdomen.  Flesh colored 3mm lesion above right ear, no drainage. There are small atypical nevi to the " patient's abdomen. Patient has no palpable cervical, axillary or inguinal lymphadenopathy.     Laboratory Studies:   Results for orders placed or performed in visit on 08/27/19   TSH   Result Value Ref Range    TSH 1.82 0.40 - 4.00 mU/L   T4 free   Result Value Ref Range    T4 Free 0.86 0.76 - 1.46 ng/dL   Comprehensive metabolic panel   Result Value Ref Range    Sodium 139 133 - 144 mmol/L    Potassium 3.6 3.4 - 5.3 mmol/L    Chloride 107 94 - 109 mmol/L    Carbon Dioxide 25 20 - 32 mmol/L    Anion Gap 7 3 - 14 mmol/L    Glucose 86 70 - 99 mg/dL    Urea Nitrogen 20 7 - 30 mg/dL    Creatinine 1.16 0.66 - 1.25 mg/dL    GFR Estimate 83 >60 mL/min/[1.73_m2]    GFR Estimate If Black >90 >60 mL/min/[1.73_m2]    Calcium 9.7 8.5 - 10.1 mg/dL    Bilirubin Total 0.8 0.2 - 1.3 mg/dL    Albumin 4.1 3.4 - 5.0 g/dL    Protein Total 7.8 6.8 - 8.8 g/dL    Alkaline Phosphatase 96 40 - 150 U/L    ALT 29 0 - 70 U/L    AST 16 0 - 45 U/L       Assessment, Plan and Long-Term Follow-Up Recommendations: Gus Guerrero is a now 34-year-old  male with a history of acute lymphoblastic leukemia that was status post bone marrow transplant approximately 15 years ago. The following are our late effects recommendations.   1. Risk of recurrence: Gus is currently 15 years post BMT for his ALL. Given the distance from the diagnosis of his primary malignancy, the likelihood of recurrence at this point is low. Will have CBC in the near future.  Only needs to be repeated as clinically indicated.  2. Psychosocial effects, learning and memory: Gus was previously found to have neurocognitive difficulties in the area of sustained attention, executive functioning and processing speed. He had repeat neuropsychological testing in 2009 in which these areas were improved. Gus appears to be functioning well and will be continuing grad school through next Spring.  Should he notice any changes in his neurocognitive function then we will recommend repeat  neuropsychological testing to be completed at that time. His anxiety is improved with being back on his meds.  He is seeing his PCP for regular follow up.  We also discussed that some pts can have PTSD after cancer therapy, but his seemed more generalized.  3. Risk for cardiac toxicity: Gus was treated with anthracycline chemotherapy as well as TBI which predispose him to late cardiac dysfunction. There have also been recent studies of metabolic syndromes in childhood cancer survivors which found that BMT survivors may be predisposed to early heart disease as well. Gus was found to have high triglycerides and cholesterol during this study.  Recent lipid panel was elevated.  I discussed diet changes for triglycerides and I would recommend that he discuss statin use with his PCP since his LDL is over 160.  Gus had a normal echocardiogram today and we recommend repeat followup for his in approximately 5 years. Gus also had other extensive testing given his past symptoms which have been negative.    4. Risk for kidney damage: Gus was exposed to multiple chemotherapeutic agents and GVHD prophylactic agents which can cause renal dysfunction. Creatinine was mildly elevated at last check but normal this year.  We discussed refraining from protein shakes. We would recommend that he keep himself very well hydrated and try to avoid long term NSAID use.   5. Male issues related to fertility: Gus was treated with a large amount of alkylating agent chemotherapy as well as local testicular radiation. This has caused him to have some degree of seminiferous tubule dysfunction. We are still monitoring for leydig cell dysfunction as this can change over time. As previously found when he was enrolled in the metabolic syndrome study for childhood cancer survivors in 2008, Gus had an elevated FSH. He most recently had semen analysis completed in 2009 which did show he had no progressively motile sperm. He does have sperm banked at  a cryo bank to be used in the future should he be interested in conceiving. They had one child via IVF and will be planning for another procedure this fall.  6. Risk for secondary malignancy: Because of his prior treatment with radiation therapy, Gus may be at increased risk for developing secondary malignancies. He has a few atypical-appearing nevi that are small on his abdomen that would continue to require monitoring.  I recommended that he have a full skin exam with dermatology.   We also recommended that Gus do monthly self testicular exams due to his previous radiation. Gus should also have a thyroid exam completed yearly due to his TBI.   Exam today is normal. I also discussed the updated guidelines that recommend patients that were exposed to any abdominal directed radiation have early colon cancer screening starting at 30 years old.  We discussed the options of colonoscopy every 5 years vs cologuard every 3 years.  He would prefer colonoscopy.  He will discuss a recommended center with his PCP if they don't have a preferred center then we will request the procedure to be completed here with our gastroenterology department.  7. Risk for bone damage: Gus was previously treated with multiple steroids for his transplant which can predispose him to bone density difficulties. He had a DEXA scan completed in 2008 that was within normal limits. Repeat as needed.  8. Risk for eye toxicity: Because of Gus's exposure to total body irradiation, he is at increased risk for developing cataracts. Bilateral cataracts were noted on today's exam. It is recommended that Gus see an ophthalmologist every 1-2 years for continued evaluation and monitoring.   9. Immune status: Gus was previously immunized for hepatitis B, however, per his mother's recollection she does not believe his titers ever produced an immune response. Currently Gus is not immune to hepatitis B since his surface antibody was completely negative. If  Gus has received the hepatitis B series on two separate occasions and the antibody is negative then likely he is just not going to respond to the vaccine. Should he have an expected exposure to hepatitis B then he should see his primary care physician for treatment recommendations.   10. Risk for thyroid damage: Gus received radiation therapy to his thyroid area which can predispose him to hypothyroidism. We would recommend he have thyroid function assessed yearly along with a physical exam. His thyroid function today was normal.  11. Osteonecrosis: Gus was previously found to have osteonecrosis of his left knee and is followed by Orthopedics in [place]. We recommend that he continue followup with their clinic. He currently does not appear to have any great functional limitations to his left lower extremity.  He is taking regular vitamin D supplements.        It was great to see Gus in the [facility] clinic today.  We appreciate the opportunity to participate in your patient's care. If you have any questions or concerns please do not hesitate to contact us at [phone]. We ask that Gus return to our clinic in 2 years for follow up.  I asked that he see his PCP for a yearly physical if he is coming here every other year.      Lisa Hilliard MSN, APRN, CPNP-AC, CPON  Department of Pediatrics  Division of Hematology/Oncology

## 2019-10-03 ENCOUNTER — HEALTH MAINTENANCE LETTER (OUTPATIENT)
Age: 34
End: 2019-10-03

## 2019-12-16 ENCOUNTER — TRANSFERRED RECORDS (OUTPATIENT)
Dept: HEALTH INFORMATION MANAGEMENT | Facility: CLINIC | Age: 34
End: 2019-12-16

## 2020-11-07 ENCOUNTER — HEALTH MAINTENANCE LETTER (OUTPATIENT)
Age: 35
End: 2020-11-07

## 2021-03-16 ENCOUNTER — IMMUNIZATION (OUTPATIENT)
Dept: NURSING | Facility: CLINIC | Age: 36
End: 2021-03-16
Payer: COMMERCIAL

## 2021-03-16 PROCEDURE — 91300 PR COVID VAC PFIZER DIL RECON 30 MCG/0.3 ML IM: CPT

## 2021-03-16 PROCEDURE — 0001A PR COVID VAC PFIZER DIL RECON 30 MCG/0.3 ML IM: CPT

## 2021-04-06 ENCOUNTER — IMMUNIZATION (OUTPATIENT)
Dept: NURSING | Facility: CLINIC | Age: 36
End: 2021-04-06
Attending: INTERNAL MEDICINE
Payer: COMMERCIAL

## 2021-04-06 PROCEDURE — 0002A PR COVID VAC PFIZER DIL RECON 30 MCG/0.3 ML IM: CPT

## 2021-04-06 PROCEDURE — 91300 PR COVID VAC PFIZER DIL RECON 30 MCG/0.3 ML IM: CPT

## 2021-09-05 ENCOUNTER — HEALTH MAINTENANCE LETTER (OUTPATIENT)
Age: 36
End: 2021-09-05

## 2021-12-26 ENCOUNTER — HEALTH MAINTENANCE LETTER (OUTPATIENT)
Age: 36
End: 2021-12-26

## 2022-03-04 ENCOUNTER — ONCOLOGY VISIT (OUTPATIENT)
Dept: ONCOLOGY | Facility: CLINIC | Age: 37
End: 2022-03-04
Payer: COMMERCIAL

## 2022-03-04 VITALS
DIASTOLIC BLOOD PRESSURE: 102 MMHG | WEIGHT: 190.5 LBS | OXYGEN SATURATION: 97 % | TEMPERATURE: 98 F | BODY MASS INDEX: 27.18 KG/M2 | SYSTOLIC BLOOD PRESSURE: 166 MMHG | RESPIRATION RATE: 18 BRPM | HEART RATE: 69 BPM

## 2022-03-04 DIAGNOSIS — Z92.21 STATUS POST CHEMOTHERAPY: ICD-10-CM

## 2022-03-04 DIAGNOSIS — Z92.3 HISTORY OF EXTERNAL BEAM RADIATION THERAPY: ICD-10-CM

## 2022-03-04 DIAGNOSIS — Z85.6 HISTORY OF LEUKEMIA: Primary | ICD-10-CM

## 2022-03-04 LAB
HBA1C MFR BLD: 5.5 % (ref 0–5.6)
TSH SERPL DL<=0.005 MIU/L-ACNC: 2.94 MU/L (ref 0.4–4)

## 2022-03-04 PROCEDURE — 82785 ASSAY OF IGE: CPT | Performed by: NURSE PRACTITIONER

## 2022-03-04 PROCEDURE — G0463 HOSPITAL OUTPT CLINIC VISIT: HCPCS

## 2022-03-04 PROCEDURE — 82784 ASSAY IGA/IGD/IGG/IGM EACH: CPT | Performed by: NURSE PRACTITIONER

## 2022-03-04 PROCEDURE — 83036 HEMOGLOBIN GLYCOSYLATED A1C: CPT | Performed by: NURSE PRACTITIONER

## 2022-03-04 PROCEDURE — 36415 COLL VENOUS BLD VENIPUNCTURE: CPT | Performed by: NURSE PRACTITIONER

## 2022-03-04 PROCEDURE — 86355 B CELLS TOTAL COUNT: CPT | Performed by: NURSE PRACTITIONER

## 2022-03-04 PROCEDURE — 99215 OFFICE O/P EST HI 40 MIN: CPT | Performed by: NURSE PRACTITIONER

## 2022-03-04 PROCEDURE — 84443 ASSAY THYROID STIM HORMONE: CPT | Performed by: NURSE PRACTITIONER

## 2022-03-04 RX ORDER — ROSUVASTATIN CALCIUM 5 MG/1
5 TABLET, COATED ORAL DAILY
COMMUNITY
Start: 2022-02-26

## 2022-03-04 ASSESSMENT — PAIN SCALES - GENERAL: PAINLEVEL: NO PAIN (0)

## 2022-03-04 NOTE — LETTER
3/4/2022         RE: Gus Guerrero  [address]  [facility] [address]        Dear Colleague,    Thank you for referring your patient, Gus Guerrero, to the [facility]. Please see a copy of my visit note below.    We had the pleasure of seeing your patient, Gus Guerrero, at the [facility] for childhood cancer survivors. The following is a summary of your patient's cancer, therapy, current history and physical findings followed by an assessment and long-term follow-up evaluation.   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 He was high risk due to his age. He was treated at the [facility] on Protocol [id number] Regimen A and completed his initial therapy on May 6, 2002. He received the following chemotherapy for his initial regimen.   1. Cyclophosphamide intravenously with a cumulative dose of 3 gm/M2   2. Cytarabine intravenously with a cumulative dose of 2100 mg/M2   3. Cytarabine intrathecally with a cumulative dose of 47 mg/M2   4. Daunorubicin intravenously with a cumulative dose of 100 mg/M2 (anthracycline equivalent 83 mg/M2)   5. Dexamethasone orally   6. Doxorubicin intravenously with a cumulative dose of 75 mg/M2   7. L-asparaginase intramuscularly   8. 6-mercaptopurine orally   9. Methotrexate orally   10. Methotrexate intrathecally with a cumulative dose of 192 mg/M2   11. Prednisone orally   12. 6-thioguanine orally   13. Vincristine intravenously     Unfortunately, Gus was found to have a recurrence of his disease approximately two years after therapy was completed on March 24, 2004 to his bone marrow. He was treated with a relapse regimen [id number] Regimen A which was completed with his bone marrow transplantation. He received the following chemotherapy on  Regimen [id number].   1. Cyclophosphamide intravenously with a cumulative dose of 2.2 gm/M2   2. Cytarabine intravenously with a cumulative dose of 24 g/M2   3. Cytarabine intrathecally with a cumulative dose of 41 mg/M2   4. Doxorubicin intravenously with a cumulative dose of 60 mg/M2   5. L-asparaginase intramuscularly   6. Methotrexate intravenously with a cumulative dose of 5 g/M2   7. Methotrexate intrathecally with a cumulative dose of 28 mg/M2   8. PEG asparaginase intramuscularly   9. Prednisone orally   10. Vincristine intravenously   11. 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 He received the following conditioning regimen for his transplant:   1. Cyclophosphamide intravenously with a cumulative dose of 4371 mg/M2   2. Fludarabine intravenously with a cumulative dose of 78 mg/M2   3. Total body irradiation which started on July 24, 2004 and was completed on July 27, 2004 in eight fractions at 165 cGy/fraction for a total dose of 1320 cGy.   4. Testicular boost completed on July 24, 2004 in two fractions at 200 cGy/fraction for a total dose of 400 cGy.     Gus had GVHD prophylaxis with cyclosporin and MMF and steroids. He was off all immune suppression in June of 2005.     Gus's acute and chronic late effects known to date include:   1. 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   2. 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   3. Mild air trapping via PFTs July 2005 with no current symptoms.   4. High triglycerides found in February 2008.   5. Problems with sustained attention, executive functioning and processing speed initially seen in August of 2005 which were improved on recent neuropsychological testing in 2009.   6. Osteonecrosis of the left medial femoral condyle found on May 19, 2008.   7. Compound nevus resected on  September 5, 2008.   8.  Anxiety    Current Medications:   Current Outpatient Medications   Medication     aspirin 81 MG EC tablet     citalopram (CELEXA) 20 MG tablet     VITAMIN D 2000 UNIT PO TABS     LORazepam (ATIVAN) 1 MG tablet     rosuvastatin (CRESTOR) 5 MG tablet     No current facility-administered medications for this visit.     Gus has medication allergies to Bactrim, Biaxin, Serevent, Nuts, and Dapsone    History of Present Illness: Gus presents to the visit today alone.  His last visit with us was in August 2019.  He has been doing well since his last visit.  He has 2 children now and his wife is pregnant with their 3rd child now, due in September 2022.    No recent medical issues.  He did have the COVID and flu vaccines this year.  He reports having a concern that he didn't mount a response to his COVID vaccine.  He also didn't mount a response to his Hep B vaccine in the past.  He is wondering if this is related to an overall immune system issue post BMT.   He wears sunscreen regularly.    No problems with his vision.  He sees the eye dr every 2 years for cataract follow up.  Due for a visit.    No recent issues with knee AVN and seeing ortho as needed. Hasn't been exercising as much lately.  He also endorses that his diet hasn't been great.  He is planning to try to get back on track with that now. Gus's BP has been high at the last 2 dr visits.  It isn't as high when he checks it at home.  No HA, vision changes or dizziness.     No dry skin, mouth, or eyes.  He is on Celexa 10 mg every day for anxiety.  He feels like it is helping.  He is no longer having anxiety attack symptoms like he was before.  No significant illnesses or fevers.  Has been seen by derm yearly. Had a dysplastic mole removed from his back in the past.  Did have a colonoscopy for screening post radiation 12/2019.  There were a few small polyps and plans to have another scope in 2024.      Review of systems:    General:  No  acute distress  Skin: history of dysplastic mole.  Eyes: cataracts; followed  by eye dr  Ears/Nose/Throat: negative  Respiratory: No SOB or increased WOB; no cough or wheezing  Cardiovascular: negative for palpitations or chest pain  Gastrointestinal: negative; no pain; No N/V/D/C  Genitourinary: negative  Musculoskeletal: left knee osteonecrosis s/p surgery 2009  Neurologic: negative  Psychiatric:  Anxiety- improved  Hematologic/Lymphatic/Immunologic: negative  Endocrine: negative    Social History: Gus completed his undergraduate degree at [employer] and was accepted to the [employer] [place] for a combined master's degree in health policy administration and business administration.  He has completed his Masters degree.  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    Gus is  with 2 children.  His wife is pregnant with their 3rd child. No tobacco or alcohol or recreational drug use. He has banked sperm.      Family History: Gus's paternal aunt has a history of basal cell carcinoma. Paternal grandmother with a history of ovarian cancer at the age of 81. Paternal grandfather also with skin cancer in the area of basal cell carcinoma. Gus's father has a history of hypertension and high cholesterol and has had multiple stents placed. His maternal grandfather has a history of heart disease as well. His paternal grandmother has a pacemaker. His paternal grandmother also has diabetes which is controlled with diet.  Paternal 1st cousin with leukemia (remission).  Father was also diagnosed with CAD.    Physical Examination: BP (!) 166/102   Pulse 69   Temp 98  F (36.7  C) (Oral)   Resp 18   Wt 86.4 kg (190 lb 8 oz)   SpO2 97%   BMI 27.18 kg/m      In general, Gus appears to be in no acute distress. He is normocephalic/atraumatic. His sclera are anicteric. Pupils are equally round and reactive to light and accommodation. Extraocular movements are intact. Funduscopic exam reveal moderate bilateral  posterior subcapsular cataracts (stable compared with prior). Bilateral tympanic membranes are within normal limits. The oropharynx is clear without lesions. Neck is supple. Thyroid non-palpable.   Lungs clear to auscultation bilaterally. The heart is of regular rate and rhythm with no murmurs, rubs or gallops. The abdomen is non-distended, non-tender and soft. No hepatosplenomegaly or palpable masses are noted.    exam deferred.  Musculoskeletal exam reveals gait within normal limits.  Muscular strength 5/5 throughout the lower extremities, no edema or effusion noted to the patient's left knee. Skin exam reveals multiple striae to the patient's abdomen.   There are small atypical nevi to the patient's abdomen. Patient has no palpable cervical or inguinal lymphadenopathy.     Laboratory Studies:   Results for orders placed or performed in visit on 03/04/22   IgE     Status: Normal   Result Value Ref Range    Immunoglobulin E 59 0 - 114 kU/L   IgA     Status: Normal   Result Value Ref Range    Immunoglobulin A 240 84 - 499 mg/dL   IgG     Status: Normal   Result Value Ref Range    Immunoglobulin G 1,008 610-1,616 mg/dL   IgM     Status: Normal   Result Value Ref Range    Immunoglobulin M 105 35 - 242 mg/dL   T cell subset extended profile     Status: None   Result Value Ref Range    CD3% Total T Cells 58 49 - 84 %    Absolute CD3, Total T Cells 1,028 603-2,990 cells/uL    CD4% [place] T Cells 34 28 - 63 %    Absolute CD4, [place] T Cells 601 441-2,156 cells/uL    CD8% Suppressor T Cells 24 10 - 40 %    Absolute CD8, Suppressor T Cells 420 125-1,312 cells/uL    CD4:CD8 Ratio 1.43 1.40 - 2.60    CD16+CD56% [place] Cells 14 4 - 25 %    Absolute CD16+CD56, [place] Cells 250 95 - 640 cells/uL    CD19% B Cells 25 6 - 27 %    Absolute CD19, B Cells 442 107 - 698 cells/uL    T Cell Extended Comment     TSH with free T4 reflex     Status: Normal   Result Value Ref Range    TSH 2.94 0.40 - 4.00 mU/L   Hemoglobin  A1c     Status: Normal   Result Value Ref Range    Hemoglobin A1C 5.5 0.0 - 5.6 %       Assessment, Plan and Long-Term Follow-Up Recommendations: Gus Guerrero is a now 36-year-old  male with a history of acute lymphoblastic leukemia that was status post bone marrow transplant approximately 17 years ago. The following are our late effects recommendations.     New late effects:  Colon polyps (benign)    1. Risk of recurrence: Gus is currently 17 years post BMT for his ALL. Given the distance from the diagnosis of his primary malignancy, the likelihood of recurrence at this point is low. Will have CBC in the near future.  Only needs to be repeated as clinically indicated.  2. Psychosocial effects, learning and memory: Gus was previously found to have neurocognitive difficulties in the area of sustained attention, executive functioning and processing speed. He had repeat neuropsychological testing in 2009 in which these areas were improved. Gus appears to be functioning well and will be continuing grad school through next Spring.  Should he notice any changes in his neurocognitive function then we will recommend repeat neuropsychological testing to be completed at that time. His anxiety is improved with being back on his meds.  He is seeing his PCP for regular follow up.  We also discussed that some pts can have PTSD after cancer therapy, but his seemed more like generalized anxiety.  3. Risk for cardiac toxicity: Gus was treated with anthracycline chemotherapy as well as TBI which predispose him to late cardiac dysfunction. There have also been recent studies of metabolic syndromes in childhood cancer survivors which found that BMT survivors may be predisposed to early heart disease as well. Gus was found to have high triglycerides and cholesterol during this study.  Should have lipid panel every 2 years along with Hgb A1C.  He is due for an echocardiogram when he comes back in 2 years (2024).  Gus also  had other extensive testing given his past symptoms which have been negative.    4. Risk for kidney damage: Gus was exposed to multiple chemotherapeutic agents and GVHD prophylactic agents which can cause renal dysfunction. Creatinine was mildly elevated in the past but normal at last check.  We discussed refraining from protein shakes. We would recommend that he keep himself very well hydrated and try to avoid long term NSAID use.   5. Male issues related to fertility: Gus was treated with a large amount of alkylating agent chemotherapy as well as local testicular radiation. This has caused him to have some degree of seminiferous tubule dysfunction. We are still monitoring for leydig cell dysfunction as this can change over time. As previously found when he was enrolled in the metabolic syndrome study for childhood cancer survivors in 2008, Gus had an elevated FSH. He most recently had semen analysis completed in 2009 which did show he had no progressively motile sperm. He does have sperm banked at a cryo bank to be used in the future should he be interested in conceiving. They had 2 children via IVF and his wife is currently pregnant with their 3rd child.  6. Risk for secondary malignancy: Because of his prior treatment with radiation therapy, Gus may be at increased risk for developing secondary malignancies. He has a few atypical-appearing nevi that are small on his abdomen that would continue to require monitoring.  I recommended that he have a full skin exam with dermatology yearly due to his history.   We also recommended that Gus do monthly self testicular exams due to his previous radiation. Gus should also have a thyroid exam completed yearly due to his TBI.   Exam today is normal. I also discussed the updated guidelines that recommend patients that were exposed to any abdominal directed radiation have early colon cancer screening starting at 30 years old.  We discussed the options of colonoscopy  every 5 years vs cologuard every 3 years.  He would prefer colonoscopy.  He will discuss a recommended center with his PCP if they don't have a preferred center then we will request the procedure to be completed here with our gastroenterology department.  7. Risk for bone damage: Gus was previously treated with multiple steroids for his transplant which can predispose him to bone density difficulties. He had a DEXA scan completed in 2008 that was within normal limits. Repeat as needed.  8. Risk for eye toxicity: Because of Gus's exposure to total body irradiation, he is at increased risk for developing cataracts. Bilateral cataracts were noted on today's exam. It is recommended that Gus see an ophthalmologist every 1-2 years for continued evaluation and monitoring.   9. Immune status: Gus was previously immunized for hepatitis B, however, per his mother's recollection she does not believe his titers ever produced an immune response. Currently Gus is not immune to hepatitis B since his surface antibody was completely negative. If Gus has received the hepatitis B series on two separate occasions and the antibody is negative then likely he is just not going to respond to the vaccine. Should he have an expected exposure to hepatitis B then he should see his primary care physician for treatment recommendations.  He also told me that his COVID vaccine did not produce any antibodies.  I did check immunoglobulins and T cell subsets post BMT today since they haven't been checked since 2006.  Those all came back normal so we feel that Gus's immune system is intact given his distance from transplant.  If he would like to discuss this issue more, I would recommend that he see Dr. Lance Vences with [facility].  10. Risk for thyroid damage: Gus received radiation therapy to his thyroid area which can predispose him to hypothyroidism. We would recommend he have thyroid function assessed yearly along with a  physical exam. His thyroid function today was normal.  11. Osteonecrosis: Gus was previously found to have osteonecrosis of his left knee and is followed by Orthopedics in [place]. We recommend that he continue followup with their clinic. He currently does not appear to have any great functional limitations to his left lower extremity.  He is taking regular vitamin D supplements.        It was great to see Gus in the [facility] clinic today.  We appreciate the opportunity to participate in your patient's care. If you have any questions or concerns please do not hesitate to contact us at [phone]. We ask that Gus return to our clinic in 2 years for follow up.  I asked that he see his PCP for a yearly physical if he is coming here every other year.      Lisa Hilliard MSN, APRN, CPNP-AC, CPON  Department of Pediatrics  Division of Hematology/Oncology    Review of the result(s) of each unique test - Ig,A,E,M, T cell subsets, HgbA1C, TSH, free T4  Total time spent on the following services on the date of the encounter:  Preparing to see patient, chart review, review of outside records, Ordering medications, test, procedures, chemotherapy, Interpretation of labs, imaging and other tests, Performing a medically appropriate examination , Counseling and educating the patient/family/caregiver , Documenting clinical information in the electronic or other health record , Communicating results to the patient/family/caregiver , Care coordination  and Total time spent: 50 min        Again, thank you for allowing me to participate in the care of your patient.      Sincerely,    THUAN Lopez CNP

## 2022-03-04 NOTE — NURSING NOTE
Chief Complaint   Patient presents with     Oncology Clinic Visit     LONG TERM F/U: ALL (acute lymphocytic leukemia) (H)     Labs drawn with vpt by rn.  Pt tolerated well.      Benedicto Sheppard RN

## 2022-03-04 NOTE — NURSING NOTE
"Oncology Rooming Note    March 4, 2022 11:14 AM   Gus Guerrero is a 36 year old male who presents for:    Chief Complaint   Patient presents with     Oncology Clinic Visit     LONG TERM F/U: ALL (acute lymphocytic leukemia) (H)     Initial Vitals: BP (!) 166/102   Pulse 69   Resp 18   Wt 86.4 kg (190 lb 8 oz)   SpO2 97%   BMI 27.18 kg/m   Estimated body mass index is 27.18 kg/m  as calculated from the following:    Height as of 8/27/19: 1.783 m (5' 10.2\").    Weight as of this encounter: 86.4 kg (190 lb 8 oz). Body surface area is 2.07 meters squared.  No Pain (0) Comment: Data Unavailable   No LMP for male patient.  Allergies reviewed: Yes  Medications reviewed: Yes    Medications: Medication refills not needed today.  Pharmacy name entered into [facility]:    [facility] [place] [place] - [address], [address]/[facility] [id number] - [place], [place] - [address] [place] [facility]    Clinical concerns: BP has been reading high in the last few days. BP recheck 162/106. Has nothing prescribed for BP.       Rianna Medina CMA            "

## 2022-03-05 LAB
CD19 CELLS # BLD: 442 CELLS/UL (ref 107–698)
CD19 CELLS NFR BLD: 25 % (ref 6–27)
CD3 CELLS # BLD: 1028 CELLS/UL (ref 603–2990)
CD3 CELLS NFR BLD: 58 % (ref 49–84)
CD3+CD4+ CELLS # BLD: 601 CELLS/UL (ref 441–2156)
CD3+CD4+ CELLS NFR BLD: 34 % (ref 28–63)
CD3+CD4+ CELLS/CD3+CD8+ CLL BLD: 1.43 % (ref 1.4–2.6)
CD3+CD8+ CELLS # BLD: 420 CELLS/UL (ref 125–1312)
CD3+CD8+ CELLS NFR BLD: 24 % (ref 10–40)
CD3-CD16+CD56+ CELLS # BLD: 250 CELLS/UL (ref 95–640)
CD3-CD16+CD56+ CELLS NFR BLD: 14 % (ref 4–25)
T CELL EXTENDED COMMENT: NORMAL

## 2022-03-07 LAB
IGA SERPL-MCNC: 240 MG/DL (ref 84–499)
IGE SERPL-ACNC: 59 KU/L (ref 0–114)
IGG SERPL-MCNC: 1008 MG/DL (ref 610–1616)
IGM SERPL-MCNC: 105 MG/DL (ref 35–242)

## 2022-03-08 NOTE — PROGRESS NOTES
We had the pleasure of seeing your patient, Gus Guerrero, at the [facility] for childhood cancer survivors. The following is a summary of your patient's cancer, therapy, current history and physical findings followed by an assessment and long-term follow-up evaluation.   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 He was high risk due to his age. He was treated at the [facility] on Protocol [id number] Regimen A and completed his initial therapy on May 6, 2002. He received the following chemotherapy for his initial regimen.   1. Cyclophosphamide intravenously with a cumulative dose of 3 gm/M2   2. Cytarabine intravenously with a cumulative dose of 2100 mg/M2   3. Cytarabine intrathecally with a cumulative dose of 47 mg/M2   4. Daunorubicin intravenously with a cumulative dose of 100 mg/M2 (anthracycline equivalent 83 mg/M2)   5. Dexamethasone orally   6. Doxorubicin intravenously with a cumulative dose of 75 mg/M2   7. L-asparaginase intramuscularly   8. 6-mercaptopurine orally   9. Methotrexate orally   10. Methotrexate intrathecally with a cumulative dose of 192 mg/M2   11. Prednisone orally   12. 6-thioguanine orally   13. Vincristine intravenously     Unfortunately, Gus was found to have a recurrence of his disease approximately two years after therapy was completed on March 24, 2004 to his bone marrow. He was treated with a relapse regimen [id number] Regimen A which was completed with his bone marrow transplantation. He received the following chemotherapy on Regimen [id number].   1. Cyclophosphamide intravenously with a cumulative dose of 2.2 gm/M2   2. Cytarabine intravenously with a cumulative dose of 24 g/M2   3. Cytarabine intrathecally with a cumulative dose of 41 mg/M2   4. Doxorubicin intravenously  with a cumulative dose of 60 mg/M2   5. L-asparaginase intramuscularly   6. Methotrexate intravenously with a cumulative dose of 5 g/M2   7. Methotrexate intrathecally with a cumulative dose of 28 mg/M2   8. PEG asparaginase intramuscularly   9. Prednisone orally   10. Vincristine intravenously   11. 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 He received the following conditioning regimen for his transplant:   1. Cyclophosphamide intravenously with a cumulative dose of 4371 mg/M2   2. Fludarabine intravenously with a cumulative dose of 78 mg/M2   3. Total body irradiation which started on July 24, 2004 and was completed on July 27, 2004 in eight fractions at 165 cGy/fraction for a total dose of 1320 cGy.   4. Testicular boost completed on July 24, 2004 in two fractions at 200 cGy/fraction for a total dose of 400 cGy.     Gus had GVHD prophylaxis with cyclosporin and MMF and steroids. He was off all immune suppression in June of 2005.     Gus's acute and chronic late effects known to date include:   1. 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   2. 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   3. Mild air trapping via PFTs July 2005 with no current symptoms.   4. High triglycerides found in February 2008.   5. Problems with sustained attention, executive functioning and processing speed initially seen in August of 2005 which were improved on recent neuropsychological testing in 2009.   6. Osteonecrosis of the left medial femoral condyle found on May 19, 2008.   7. Compound nevus resected on September 5, 2008.   8.  Anxiety    Current Medications:   Current Outpatient Medications   Medication     aspirin 81 MG EC tablet     citalopram (CELEXA) 20 MG tablet     VITAMIN D 2000 UNIT PO TABS     LORazepam (ATIVAN) 1 MG tablet     rosuvastatin  (CRESTOR) 5 MG tablet     No current facility-administered medications for this visit.     Gus has medication allergies to Bactrim, Biaxin, Serevent, Nuts, and Dapsone    History of Present Illness: Gus presents to the visit today alone.  His last visit with us was in August 2019.  He has been doing well since his last visit.  He has 2 children now and his wife is pregnant with their 3rd child now, due in September 2022.    No recent medical issues.  He did have the COVID and flu vaccines this year.  He reports having a concern that he didn't mount a response to his COVID vaccine.  He also didn't mount a response to his Hep B vaccine in the past.  He is wondering if this is related to an overall immune system issue post BMT.   He wears sunscreen regularly.    No problems with his vision.  He sees the eye dr every 2 years for cataract follow up.  Due for a visit.    No recent issues with knee AVN and seeing ortho as needed. Hasn't been exercising as much lately.  He also endorses that his diet hasn't been great.  He is planning to try to get back on track with that now. Gus's BP has been high at the last 2 dr visits.  It isn't as high when he checks it at home.  No HA, vision changes or dizziness.     No dry skin, mouth, or eyes.  He is on Celexa 10 mg every day for anxiety.  He feels like it is helping.  He is no longer having anxiety attack symptoms like he was before.  No significant illnesses or fevers.  Has been seen by derm yearly. Had a dysplastic mole removed from his back in the past.  Did have a colonoscopy for screening post radiation 12/2019.  There were a few small polyps and plans to have another scope in 2024.      Review of systems:    General:  No acute distress  Skin: history of dysplastic mole.  Eyes: cataracts; followed  by eye dr  Ears/Nose/Throat: negative  Respiratory: No SOB or increased WOB; no cough or wheezing  Cardiovascular: negative for palpitations or chest pain  Gastrointestinal:  negative; no pain; No N/V/D/C  Genitourinary: negative  Musculoskeletal: left knee osteonecrosis s/p surgery 2009  Neurologic: negative  Psychiatric:  Anxiety- improved  Hematologic/Lymphatic/Immunologic: negative  Endocrine: negative    Social History: Gus completed his undergraduate degree at [employer] and was accepted to the [employer] for a combined master's degree in health policy administration and business administration.  He has completed his Masters degree.  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    Gus is  with 2 children.  His wife is pregnant with their 3rd child. No tobacco or alcohol or recreational drug use. He has banked sperm.      Family History: Gus's paternal aunt has a history of basal cell carcinoma. Paternal grandmother with a history of ovarian cancer at the age of 81. Paternal grandfather also with skin cancer in the area of basal cell carcinoma. Gus's father has a history of hypertension and high cholesterol and has had multiple stents placed. His maternal grandfather has a history of heart disease as well. His paternal grandmother has a pacemaker. His paternal grandmother also has diabetes which is controlled with diet.  Paternal 1st cousin with leukemia (remission).  Father was also diagnosed with CAD.    Physical Examination: BP (!) 166/102   Pulse 69   Temp 98  F (36.7  C) (Oral)   Resp 18   Wt 86.4 kg (190 lb 8 oz)   SpO2 97%   BMI 27.18 kg/m      In general, Gus appears to be in no acute distress. He is normocephalic/atraumatic. His sclera are anicteric. Pupils are equally round and reactive to light and accommodation. Extraocular movements are intact. Funduscopic exam reveal moderate bilateral posterior subcapsular cataracts (stable compared with prior). Bilateral tympanic membranes are within normal limits. The oropharynx is clear without lesions. Neck is supple. Thyroid non-palpable.   Lungs clear to auscultation bilaterally. The heart is  of regular rate and rhythm with no murmurs, rubs or gallops. The abdomen is non-distended, non-tender and soft. No hepatosplenomegaly or palpable masses are noted.    exam deferred.  Musculoskeletal exam reveals gait within normal limits.  Muscular strength 5/5 throughout the lower extremities, no edema or effusion noted to the patient's left knee. Skin exam reveals multiple striae to the patient's abdomen.   There are small atypical nevi to the patient's abdomen. Patient has no palpable cervical or inguinal lymphadenopathy.     Laboratory Studies:   Results for orders placed or performed in visit on 03/04/22   IgE     Status: Normal   Result Value Ref Range    Immunoglobulin E 59 0 - 114 kU/L   IgA     Status: Normal   Result Value Ref Range    Immunoglobulin A 240 84 - 499 mg/dL   IgG     Status: Normal   Result Value Ref Range    Immunoglobulin G 1,008 610-1,616 mg/dL   IgM     Status: Normal   Result Value Ref Range    Immunoglobulin M 105 35 - 242 mg/dL   T cell subset extended profile     Status: None   Result Value Ref Range    CD3% Total T Cells 58 49 - 84 %    Absolute CD3, Total T Cells 1,028 603-2,990 cells/uL    CD4% [place] T Cells 34 28 - 63 %    Absolute CD4, [place] T Cells 601 441-2,156 cells/uL    CD8% Suppressor T Cells 24 10 - 40 %    Absolute CD8, Suppressor T Cells 420 125-1,312 cells/uL    CD4:CD8 Ratio 1.43 1.40 - 2.60    CD16+CD56% [place] Cells 14 4 - 25 %    Absolute CD16+CD56, [place] Cells 250 95 - 640 cells/uL    CD19% B Cells 25 6 - 27 %    Absolute CD19, B Cells 442 107 - 698 cells/uL    T Cell Extended Comment     TSH with free T4 reflex     Status: Normal   Result Value Ref Range    TSH 2.94 0.40 - 4.00 mU/L   Hemoglobin A1c     Status: Normal   Result Value Ref Range    Hemoglobin A1C 5.5 0.0 - 5.6 %       Assessment, Plan and Long-Term Follow-Up Recommendations: Gus Guerrero is a now 36-year-old  male with a history of acute lymphoblastic leukemia that was  status post bone marrow transplant approximately 17 years ago. The following are our late effects recommendations.     New late effects:  Colon polyps (benign)    1. Risk of recurrence: Gus is currently 17 years post BMT for his ALL. Given the distance from the diagnosis of his primary malignancy, the likelihood of recurrence at this point is low. Will have CBC in the near future.  Only needs to be repeated as clinically indicated.  2. Psychosocial effects, learning and memory: Gus was previously found to have neurocognitive difficulties in the area of sustained attention, executive functioning and processing speed. He had repeat neuropsychological testing in 2009 in which these areas were improved. Gus appears to be functioning well and will be continuing grad school through next Spring.  Should he notice any changes in his neurocognitive function then we will recommend repeat neuropsychological testing to be completed at that time. His anxiety is improved with being back on his meds.  He is seeing his PCP for regular follow up.  We also discussed that some pts can have PTSD after cancer therapy, but his seemed more like generalized anxiety.  3. Risk for cardiac toxicity: Gus was treated with anthracycline chemotherapy as well as TBI which predispose him to late cardiac dysfunction. There have also been recent studies of metabolic syndromes in childhood cancer survivors which found that BMT survivors may be predisposed to early heart disease as well. Gus was found to have high triglycerides and cholesterol during this study.  Should have lipid panel every 2 years along with Hgb A1C.  He is due for an echocardiogram when he comes back in 2 years (2024).  Gus also had other extensive testing given his past symptoms which have been negative.    4. Risk for kidney damage: Gus was exposed to multiple chemotherapeutic agents and GVHD prophylactic agents which can cause renal dysfunction. Creatinine was mildly  elevated in the past but normal at last check.  We discussed refraining from protein shakes. We would recommend that he keep himself very well hydrated and try to avoid long term NSAID use.   5. Male issues related to fertility: Gus was treated with a large amount of alkylating agent chemotherapy as well as local testicular radiation. This has caused him to have some degree of seminiferous tubule dysfunction. We are still monitoring for leydig cell dysfunction as this can change over time. As previously found when he was enrolled in the metabolic syndrome study for childhood cancer survivors in 2008, Gus had an elevated FSH. He most recently had semen analysis completed in 2009 which did show he had no progressively motile sperm. He does have sperm banked at a cryo bank to be used in the future should he be interested in conceiving. They had 2 children via IVF and his wife is currently pregnant with their 3rd child.  6. Risk for secondary malignancy: Because of his prior treatment with radiation therapy, Gus may be at increased risk for developing secondary malignancies. He has a few atypical-appearing nevi that are small on his abdomen that would continue to require monitoring.  I recommended that he have a full skin exam with dermatology yearly due to his history.   We also recommended that Gus do monthly self testicular exams due to his previous radiation. Gus should also have a thyroid exam completed yearly due to his TBI.   Exam today is normal. I also discussed the updated guidelines that recommend patients that were exposed to any abdominal directed radiation have early colon cancer screening starting at 30 years old.  We discussed the options of colonoscopy every 5 years vs cologuard every 3 years.  He would prefer colonoscopy.  He will discuss a recommended center with his PCP if they don't have a preferred center then we will request the procedure to be completed here with our gastroenterology  department.  7. Risk for bone damage: uGs was previously treated with multiple steroids for his transplant which can predispose him to bone density difficulties. He had a DEXA scan completed in 2008 that was within normal limits. Repeat as needed.  8. Risk for eye toxicity: Because of Gus's exposure to total body irradiation, he is at increased risk for developing cataracts. Bilateral cataracts were noted on today's exam. It is recommended that Gus see an ophthalmologist every 1-2 years for continued evaluation and monitoring.   9. Immune status: Gus was previously immunized for hepatitis B, however, per his mother's recollection she does not believe his titers ever produced an immune response. Currently Gus is not immune to hepatitis B since his surface antibody was completely negative. If Gus has received the hepatitis B series on two separate occasions and the antibody is negative then likely he is just not going to respond to the vaccine. Should he have an expected exposure to hepatitis B then he should see his primary care physician for treatment recommendations.  He also told me that his COVID vaccine did not produce any antibodies.  I did check immunoglobulins and T cell subsets post BMT today since they haven't been checked since 2006.  Those all came back normal so we feel that Gus's immune system is intact given his distance from transplant.  If he would like to discuss this issue more, I would recommend that he see Dr. Lance Vences with [facility].  10. Risk for thyroid damage: Gus received radiation therapy to his thyroid area which can predispose him to hypothyroidism. We would recommend he have thyroid function assessed yearly along with a physical exam. His thyroid function today was normal.  11. Osteonecrosis: Gus was previously found to have osteonecrosis of his left knee and is followed by Orthopedics in [place]. We recommend that he continue followup with their clinic. He  currently does not appear to have any great functional limitations to his left lower extremity.  He is taking regular vitamin D supplements.        It was great to see Gus in the [facility] clinic today.  We appreciate the opportunity to participate in your patient's care. If you have any questions or concerns please do not hesitate to contact us at [phone]. We ask that Gus return to our clinic in 2 years for follow up.  I asked that he see his PCP for a yearly physical if he is coming here every other year.      Lisa Hilliard MSN, APRN, CPNP-AC, CPON  Department of Pediatrics  Division of Hematology/Oncology    Review of the result(s) of each unique test - Ig,A,E,M, T cell subsets, HgbA1C, TSH, free T4  Total time spent on the following services on the date of the encounter:  Preparing to see patient, chart review, review of outside records, Ordering medications, test, procedures, chemotherapy, Interpretation of labs, imaging and other tests, Performing a medically appropriate examination , Counseling and educating the patient/family/caregiver , Documenting clinical information in the electronic or other health record , Communicating results to the patient/family/caregiver , Care coordination  and Total time spent: 50 min

## 2022-08-11 ENCOUNTER — APPOINTMENT (OUTPATIENT)
Dept: URBAN - METROPOLITAN AREA CLINIC 259 | Age: 37
Setting detail: DERMATOLOGY
End: 2022-08-11

## 2022-08-11 VITALS — HEIGHT: 71 IN | WEIGHT: 187 LBS

## 2022-08-11 DIAGNOSIS — D22 MELANOCYTIC NEVI: ICD-10-CM

## 2022-08-11 DIAGNOSIS — D18.0 HEMANGIOMA: ICD-10-CM

## 2022-08-11 DIAGNOSIS — L57.8 OTHER SKIN CHANGES DUE TO CHRONIC EXPOSURE TO NONIONIZING RADIATION: ICD-10-CM

## 2022-08-11 DIAGNOSIS — L81.4 OTHER MELANIN HYPERPIGMENTATION: ICD-10-CM

## 2022-08-11 DIAGNOSIS — L82.1 OTHER SEBORRHEIC KERATOSIS: ICD-10-CM

## 2022-08-11 PROBLEM — D18.01 HEMANGIOMA OF SKIN AND SUBCUTANEOUS TISSUE: Status: ACTIVE | Noted: 2022-08-11

## 2022-08-11 PROBLEM — D22.5 MELANOCYTIC NEVI OF TRUNK: Status: ACTIVE | Noted: 2022-08-11

## 2022-08-11 PROBLEM — D29.4 BENIGN NEOPLASM OF SCROTUM: Status: ACTIVE | Noted: 2022-08-11

## 2022-08-11 PROCEDURE — OTHER COUNSELING: OTHER

## 2022-08-11 PROCEDURE — OTHER MIPS QUALITY: OTHER

## 2022-08-11 PROCEDURE — 99203 OFFICE O/P NEW LOW 30 MIN: CPT

## 2022-08-11 ASSESSMENT — LOCATION ZONE DERM
LOCATION ZONE: FACE
LOCATION ZONE: TRUNK

## 2022-08-11 ASSESSMENT — LOCATION DETAILED DESCRIPTION DERM
LOCATION DETAILED: LEFT MEDIAL UPPER BACK
LOCATION DETAILED: INFERIOR THORACIC SPINE
LOCATION DETAILED: LEFT MID-UPPER BACK
LOCATION DETAILED: RIGHT MID TEMPLE

## 2022-08-11 ASSESSMENT — LOCATION SIMPLE DESCRIPTION DERM
LOCATION SIMPLE: UPPER BACK
LOCATION SIMPLE: RIGHT TEMPLE
LOCATION SIMPLE: LEFT UPPER BACK

## 2022-08-29 DIAGNOSIS — Z92.3 HX OF RADIATION THERAPY: ICD-10-CM

## 2022-08-29 DIAGNOSIS — J98.59 LESION OF MEDIASTINUM: Primary | ICD-10-CM

## 2022-08-29 DIAGNOSIS — Z85.6 HISTORY OF ACUTE LYMPHOID LEUKEMIA: ICD-10-CM

## 2022-08-29 DIAGNOSIS — Z94.81 STATUS POST BONE MARROW TRANSPLANT (H): ICD-10-CM

## 2022-10-23 ENCOUNTER — HEALTH MAINTENANCE LETTER (OUTPATIENT)
Age: 37
End: 2022-10-23

## 2023-04-02 ENCOUNTER — HEALTH MAINTENANCE LETTER (OUTPATIENT)
Age: 38
End: 2023-04-02

## 2023-11-02 DIAGNOSIS — Z08 ENCOUNTER FOR FOLLOW-UP EXAMINATION AFTER COMBINED TREATMENT FOR MALIGNANT NEOPLASM: ICD-10-CM

## 2023-11-02 DIAGNOSIS — Z91.89 AT RISK FOR CARDIOMYOPATHY: ICD-10-CM

## 2023-11-02 DIAGNOSIS — Z92.3 HX OF RADIATION THERAPY: ICD-10-CM

## 2023-11-02 DIAGNOSIS — Z85.6 HISTORY OF ACUTE LYMPHOID LEUKEMIA: ICD-10-CM

## 2023-11-02 DIAGNOSIS — Z94.81 STATUS POST BONE MARROW TRANSPLANT (H): Primary | ICD-10-CM

## 2024-04-05 ENCOUNTER — ONCOLOGY VISIT (OUTPATIENT)
Dept: ONCOLOGY | Facility: CLINIC | Age: 39
End: 2024-04-05
Attending: PSYCHOLOGIST
Payer: COMMERCIAL

## 2024-04-05 ENCOUNTER — ANCILLARY PROCEDURE (OUTPATIENT)
Dept: CARDIOLOGY | Facility: CLINIC | Age: 39
End: 2024-04-05
Attending: NURSE PRACTITIONER
Payer: COMMERCIAL

## 2024-04-05 ENCOUNTER — LAB (OUTPATIENT)
Dept: LAB | Facility: CLINIC | Age: 39
End: 2024-04-05
Attending: PSYCHOLOGIST
Payer: COMMERCIAL

## 2024-04-05 VITALS
TEMPERATURE: 98.2 F | DIASTOLIC BLOOD PRESSURE: 102 MMHG | RESPIRATION RATE: 16 BRPM | SYSTOLIC BLOOD PRESSURE: 154 MMHG | BODY MASS INDEX: 25.27 KG/M2 | WEIGHT: 176.5 LBS | OXYGEN SATURATION: 100 % | HEIGHT: 70 IN | HEART RATE: 77 BPM

## 2024-04-05 DIAGNOSIS — Z92.21 STATUS POST CHEMOTHERAPY: ICD-10-CM

## 2024-04-05 DIAGNOSIS — Z92.3 HISTORY OF EXTERNAL BEAM RADIATION THERAPY: ICD-10-CM

## 2024-04-05 DIAGNOSIS — Z92.3 HX OF RADIATION THERAPY: ICD-10-CM

## 2024-04-05 DIAGNOSIS — Z91.89 AT RISK FOR CARDIOMYOPATHY: ICD-10-CM

## 2024-04-05 DIAGNOSIS — Z94.81 STATUS POST BONE MARROW TRANSPLANT (H): ICD-10-CM

## 2024-04-05 DIAGNOSIS — I35.9 AORTIC VALVE CALCIFICATION: ICD-10-CM

## 2024-04-05 DIAGNOSIS — Z85.6 HISTORY OF LEUKEMIA: ICD-10-CM

## 2024-04-05 DIAGNOSIS — Z94.81 STATUS POST BONE MARROW TRANSPLANT (H): Primary | ICD-10-CM

## 2024-04-05 DIAGNOSIS — Z85.6 HISTORY OF ACUTE LYMPHOID LEUKEMIA: ICD-10-CM

## 2024-04-05 DIAGNOSIS — Z08 ENCOUNTER FOR FOLLOW-UP EXAMINATION AFTER COMBINED TREATMENT FOR MALIGNANT NEOPLASM: ICD-10-CM

## 2024-04-05 LAB
ALBUMIN SERPL BCG-MCNC: 4.4 G/DL (ref 3.5–5.2)
ALP SERPL-CCNC: 90 U/L (ref 40–150)
ALT SERPL W P-5'-P-CCNC: 30 U/L (ref 0–70)
ANION GAP SERPL CALCULATED.3IONS-SCNC: 12 MMOL/L (ref 7–15)
AST SERPL W P-5'-P-CCNC: 22 U/L (ref 0–45)
BI-PLANE LVEF ECHO: NORMAL
BILIRUB SERPL-MCNC: 1.6 MG/DL
BUN SERPL-MCNC: 22.2 MG/DL (ref 6–20)
CALCIUM SERPL-MCNC: 10.1 MG/DL (ref 8.6–10)
CHLORIDE SERPL-SCNC: 104 MMOL/L (ref 98–107)
CHOLEST SERPL-MCNC: 213 MG/DL
CREAT SERPL-MCNC: 1.16 MG/DL (ref 0.67–1.17)
DEPRECATED HCO3 PLAS-SCNC: 25 MMOL/L (ref 22–29)
EGFRCR SERPLBLD CKD-EPI 2021: 83 ML/MIN/1.73M2
FASTING STATUS PATIENT QL REPORTED: YES
GLUCOSE SERPL-MCNC: 86 MG/DL (ref 70–99)
HBA1C MFR BLD: 5.7 %
HDLC SERPL-MCNC: 48 MG/DL
LDLC SERPL CALC-MCNC: 119 MG/DL
NONHDLC SERPL-MCNC: 165 MG/DL
POTASSIUM SERPL-SCNC: 4.1 MMOL/L (ref 3.4–5.3)
PROT SERPL-MCNC: 7.8 G/DL (ref 6.4–8.3)
SODIUM SERPL-SCNC: 141 MMOL/L (ref 135–145)
TRIGL SERPL-MCNC: 228 MG/DL
TSH SERPL DL<=0.005 MIU/L-ACNC: 2.65 UIU/ML (ref 0.3–4.2)

## 2024-04-05 PROCEDURE — 80053 COMPREHEN METABOLIC PANEL: CPT

## 2024-04-05 PROCEDURE — 84443 ASSAY THYROID STIM HORMONE: CPT

## 2024-04-05 PROCEDURE — 99214 OFFICE O/P EST MOD 30 MIN: CPT | Performed by: NURSE PRACTITIONER

## 2024-04-05 PROCEDURE — 83036 HEMOGLOBIN GLYCOSYLATED A1C: CPT

## 2024-04-05 PROCEDURE — 80061 LIPID PANEL: CPT

## 2024-04-05 PROCEDURE — 99215 OFFICE O/P EST HI 40 MIN: CPT | Performed by: NURSE PRACTITIONER

## 2024-04-05 PROCEDURE — 93306 TTE W/DOPPLER COMPLETE: CPT | Performed by: STUDENT IN AN ORGANIZED HEALTH CARE EDUCATION/TRAINING PROGRAM

## 2024-04-05 PROCEDURE — 36415 COLL VENOUS BLD VENIPUNCTURE: CPT

## 2024-04-05 RX ORDER — MULTIVIT-MIN/IRON/FOLIC ACID/K 18-600-40
CAPSULE ORAL
COMMUNITY

## 2024-04-05 ASSESSMENT — PAIN SCALES - GENERAL: PAINLEVEL: NO PAIN (0)

## 2024-04-05 NOTE — LETTER
4/5/2024         RE: Gus Guerrero  [address]  [facility] [address]      We had the pleasure of seeing your patient, Gus Guerrero, at the [facility] for childhood cancer survivors. The following is a summary of your patient's cancer, therapy, current history and physical findings followed by an assessment and long-term follow-up evaluation.   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 He was high risk due to his age. He was treated at the [facility] on Protocol [id number] Regimen A and completed his initial therapy on May 6, 2002. He received the following chemotherapy for his initial regimen.   1. Cyclophosphamide intravenously with a cumulative dose of 3 gm/M2   2. Cytarabine intravenously with a cumulative dose of 2100 mg/M2   3. Cytarabine intrathecally with a cumulative dose of 47 mg/M2   4. Daunorubicin intravenously with a cumulative dose of 100 mg/M2 (anthracycline equivalent 83 mg/M2)   5. Dexamethasone orally   6. Doxorubicin intravenously with a cumulative dose of 75 mg/M2   7. L-asparaginase intramuscularly   8. 6-mercaptopurine orally   9. Methotrexate orally   10. Methotrexate intrathecally with a cumulative dose of 192 mg/M2   11. Prednisone orally   12. 6-thioguanine orally   13. Vincristine intravenously     Unfortunately, Gus was found to have a recurrence of his disease approximately two years after therapy was completed on March 24, 2004 to his bone marrow. He was treated with a relapse regimen [id number] Regimen A which was completed with his bone marrow transplantation. He received the following chemotherapy on Regimen [id number].   1. Cyclophosphamide intravenously with a cumulative dose of 2.2 gm/M2   2. Cytarabine intravenously with a cumulative dose of 24 g/M2   3.  Cytarabine intrathecally with a cumulative dose of 41 mg/M2   4. Doxorubicin intravenously with a cumulative dose of 60 mg/M2   5. L-asparaginase intramuscularly   6. Methotrexate intravenously with a cumulative dose of 5 g/M2   7. Methotrexate intrathecally with a cumulative dose of 28 mg/M2   8. PEG asparaginase intramuscularly   9. Prednisone orally   10. Vincristine intravenously   11. 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 He received the following conditioning regimen for his transplant:   1. Cyclophosphamide intravenously with a cumulative dose of 4371 mg/M2   2. Fludarabine intravenously with a cumulative dose of 78 mg/M2   3. Total body irradiation which started on July 24, 2004 and was completed on July 27, 2004 in eight fractions at 165 cGy/fraction for a total dose of 1320 cGy.   4. Testicular boost completed on July 24, 2004 in two fractions at 200 cGy/fraction for a total dose of 400 cGy.     Gus had GVHD prophylaxis with cyclosporine and MMF and steroids. He was off all immune suppression in June of 2005.     Gus's acute and chronic late effects known to date include:   1. 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   2. 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   3. Mild air trapping via PFTs July 2005 with no current symptoms.   4. High triglycerides found in February 2008.   5. Problems with sustained attention, executive functioning and processing speed initially seen in August of 2005 which were improved on recent neuropsychological testing in 2009.   6. Osteonecrosis of the left medial femoral condyle found on May 19, 2008.   7. Compound nevus resected on September 5, 2008.   8.  Anxiety  9.  Colon polyps- found in 2019    Current Medications:   Current Outpatient Medications   Medication Sig Dispense Refill     Ascorbic Acid (VITAMIN C) 500 MG CAPS       aspirin 81 MG EC tablet Take 81 mg by mouth daily      citalopram (CELEXA) 20 MG tablet Take 20 mg by mouth daily.      rosuvastatin (CRESTOR) 5 MG tablet Take 5 mg by mouth daily      VITAMIN D 2000 UNIT PO TABS 1 TABLET DAILY      LORazepam (ATIVAN) 1 MG tablet Take 1 mg by mouth every 6 hours as needed. (Patient not taking: Reported on 3/4/2022)       No current facility-administered medications for this visit.     Gus has medication allergies to Bactrim, Biaxin, Serevent, Nuts, and Dapsone    History of Present Illness: Gus presents to the visit today alone.  His last visit with us was in March 2022.  He has been doing well since his last visit.  He has 3 children now and busy with home life and work.    No recent significant medical issues.  Did have a bilateral ear infection January 2024.  Had some immune testing in 2022 due to concerns about not mounting a response to the COVID and Hep B vaccines. Labs were normal.     He wears sunscreen regularly.    No problems with his vision.  He sees the eye dr every 2 years for cataract follow up.  Due for a visit.    No recent issues with knee AVN and seeing ortho as needed. Hasn't been exercising as much lately.  He also endorses that his diet hasn't been great.  He is planning to try to get back on track with that now. Gus's BP has been high at the last 2 dr visits.  It isn't as high when he checks it at home.  No HA, vision changes or dizziness.     No dry skin, mouth, or eyes.  He is no longer having anxiety attack symptoms like he was before.  No significant illnesses or fevers.  Has been seen by derm yearly. Had a dysplastic mole removed from his back in the past.  Did have a colonoscopy for screening post radiation 12/2019.  There were a few small polyps and plans to have another scope in 2024 (later this year).      Review of systems:    General:  No acute distress  Skin: history of dysplastic mole.  Eyes:  "cataracts; followed  by eye dr  Ears/Nose/Throat: negative  Respiratory: No SOB or increased WOB; no cough or wheezing  Cardiovascular: negative for palpitations or chest pain  Gastrointestinal: negative; no pain; No N/V/D/C  Genitourinary: negative  Musculoskeletal: left knee osteonecrosis s/p surgery 2009  Neurologic: negative  Psychiatric:  Anxiety- improved  Hematologic/Lymphatic/Immunologic: negative  Endocrine: negative    Social History: Gus completed his undergraduate degree at [employer] and was accepted to the [employer] for a combined master's degree in health policy administration and business administration.  He has completed his Masters degree.  Gus works in finance.    Gus is  with 3 children.   No tobacco or alcohol or recreational drug use. He has banked sperm.      Family History:   Family History   Problem Relation Age of Onset    Coronary Artery Disease Father     Hypertension Father     Hyperlipidemia Father     Heart Disease Father         MI    Hypertension Maternal Grandmother     Hypertension Maternal Grandfather     Heart Disease Maternal Grandfather         MI    Coronary Artery Disease Maternal Grandfather     Hypertension Paternal Grandmother     Diabetes Paternal Grandmother     Ovarian Cancer Paternal Grandmother     Arrhythmia Paternal Grandmother     Heart Disease Paternal Grandfather         heart palpitation    Basal cell carcinoma Paternal Grandfather     Hypertension Paternal Grandfather     Hypertension Brother     Leukemia Cousin     Basal cell carcinoma Paternal Aunt          Physical Examination: BP (!) 154/102 (BP Location: Right arm, Patient Position: Chair, Cuff Size: Adult Large)   Pulse 77   Temp 98.2  F (36.8  C)   Resp 16   Ht 1.782 m (5' 10.16\")   Wt 80.1 kg (176 lb 8 oz)   SpO2 100%   BMI 25.21 kg/m   no red flag symptoms with BP.    In general, Gus appears to be in no acute distress. He is normocephalic/atraumatic. His sclera are " anicteric. Pupils are equally round and reactive to light and accommodation. Extraocular movements are intact. Funduscopic exam reveal moderate bilateral posterior subcapsular cataracts (stable compared with prior). Bilateral tympanic membranes are within normal limits. The oropharynx is clear without lesions. Neck is supple. Thyroid non-palpable.   Lungs clear to auscultation bilaterally. The heart is of regular rate and rhythm with no murmurs, rubs or gallops. The abdomen is non-distended, non-tender and soft. No hepatosplenomegaly or palpable masses are noted.    exam deferred.  Musculoskeletal exam reveals gait within normal limits.  Muscular strength 5/5 throughout the lower extremities, no edema or effusion noted to the patient's left knee. Skin exam reveals multiple striae to the patient's abdomen.   There are small atypical nevi to the patient's abdomen. Patient has no palpable cervical or inguinal lymphadenopathy.     Laboratory Studies:   Results for orders placed or performed in visit on 04/05/24   Hemoglobin A1c     Status: Abnormal   Result Value Ref Range    Hemoglobin A1C 5.7 (H) <5.7 %   Lipid panel reflex to direct LDL Fasting     Status: Abnormal   Result Value Ref Range    Cholesterol 213 (H) <200 mg/dL    Triglycerides 228 (H) <150 mg/dL    Direct Measure HDL 48 >=40 mg/dL    LDL Cholesterol Calculated 119 (H) <=100 mg/dL    Non HDL Cholesterol 165 (H) <130 mg/dL    Patient Fasting > 8hrs? Yes     Narrative    Cholesterol  Desirable:  <200 mg/dL    Triglycerides  Normal:  Less than 150 mg/dL  Borderline High:  150-199 mg/dL  High:  200-499 mg/dL  Very High:  Greater than or equal to 500 mg/dL    Direct Measure HDL  Female:  Greater than or equal to 50 mg/dL   Male:  Greater than or equal to 40 mg/dL    LDL Cholesterol  Desirable:  <100mg/dL  Above Desirable:  100-129 mg/dL   Borderline High:  130-159 mg/dL   High:  160-189 mg/dL   Very High:  >= 190 mg/dL    Non HDL Cholesterol  Desirable:  130  mg/dL  Above Desirable:  130-159 mg/dL  Borderline High:  160-189 mg/dL  High:  190-219 mg/dL  Very High:  Greater than or equal to 220 mg/dL   Comprehensive metabolic panel     Status: Abnormal   Result Value Ref Range    Sodium 141 135 - 145 mmol/L    Potassium 4.1 3.4 - 5.3 mmol/L    Carbon Dioxide (CO2) 25 22 - 29 mmol/L    Anion Gap 12 7 - 15 mmol/L    Urea Nitrogen 22.2 (H) 6.0 - 20.0 mg/dL    Creatinine 1.16 0.67 - 1.17 mg/dL    GFR Estimate 83 >60 mL/min/1.73m2    Calcium 10.1 (H) 8.6 - 10.0 mg/dL    Chloride 104 98 - 107 mmol/L    Glucose 86 70 - 99 mg/dL    Alkaline Phosphatase 90 40 - 150 U/L    AST 22 0 - 45 U/L    ALT 30 0 - 70 U/L    Protein Total 7.8 6.4 - 8.3 g/dL    Albumin 4.4 3.5 - 5.2 g/dL    Bilirubin Total 1.6 (H) <=1.2 mg/dL   TSH with free T4 reflex     Status: Normal   Result Value Ref Range    TSH 2.65 0.30 - 4.20 uIU/mL   Results for orders placed or performed in visit on 24   Echocardiogram Complete     Status: None   Result Value Ref Range    Biplane LVEF 54%     Narrative    [id number]  [id number]  [id number]  [id number]^PHAM^TOMASZ^LANDEN     [facility]  Diagnostic and Treatment-3rd Floor  [address] [place] [id number]     Name: SHANTEL MARTIN  MRN: [id number]  : 1985  Study Date: 2024 09:52 AM  Age: 39 yrs  Gender: Male  Patient Location: [facility]  Reason For Study: Status post bone marrow transplant (H), Hx of radiation  therapy,  Ordering Physician: TOMASZ NATH  Referring Physician: TOMASZ NATH  Performed By: Kira Silveira RDCS     BSA: 2.0 m2  Height: 70 in  Weight: 190 lb  HR: 68  BP: 154/101 mmHg  ______________________________________________________________________________  Procedure  Echocardiogram with two-dimensional, color and spectral Doppler performed.  ______________________________________________________________________________  Interpretation Summary  Biplane LVEF is 54%. Global peak LV longitudinal strain  is averaged at -18.2%.  This is within reported normal limits (normal <-18%).  Right ventricular function, chamber size, wall motion, and thickness are  normal.  Moderate aortic valve calcification is present, no evidence of stenosis.  IVC diameter <2.1 cm collapsing >50% with sniff suggests a normal RA pressure  of 3 mmHg.  No pericardial effusion is present.     This study was compared with the study from 8/27/19. No significant changes.     ______________________________________________________________________________  Left Ventricle  Left ventricular size is normal. Left ventricular wall thickness is normal.  Biplane LVEF is 54%. Left ventricular diastolic function is normal. Global  peak LV longitudinal strain is averaged at -18.2%. This is within reported  normal limits (normal <-18%). No regional wall motion abnormalities are seen.     Right Ventricle  Right ventricular function, chamber size, wall motion, and thickness are  normal.     Atria  Both atria appear normal.     Mitral Valve  The mitral valve is normal. Mitral leaflet thickness is normal. Trace mitral  insufficiency is present.     Aortic Valve  The aortic valve is tricuspid. Moderate aortic valve calcification is present.  On Doppler interrogation, there is no significant stenosis or regurgitation.     Tricuspid Valve  Trace tricuspid insufficiency is present. The peak velocity of the tricuspid  regurgitant jet is not obtainable. Pulmonary artery systolic pressure cannot  be assessed.     Pulmonic Valve  The valve leaflets are not well visualized. On Doppler interrogation, there is  no significant stenosis or regurgitation.     Vessels  Sinuses of Valsalva 3.5 cm. Ascending aorta 3.3 cm. IVC diameter <2.1 cm  collapsing >50% with sniff suggests a normal RA pressure of 3 mmHg.     Pericardium  No pericardial effusion is present.     Compared to Previous Study  This study was compared with the study from 8/27/19 .     Attestation  I have personally  viewed the imaging and agree with the interpretation and  report as documented by the fellow, Tyler Streeter, and/or edited by me.  ______________________________________________________________________________  MMode/2D Measurements & Calculations  IVSd: 0.93 cm  LVIDd: 4.5 cm  LVIDs: 3.6 cm  LVPWd: 0.83 cm  FS: 20.6 %  LV mass(C)d: 129.2 grams  LV mass(C)dI: 63.2 grams/m2  Ao root diam: 3.5 cm  asc Aorta Diam: 3.4 cm  LVOT diam: 2.2 cm  LVOT area: 4.0 cm2  Ao root diam index Ht(cm/m): 2.0  Ao root diam index BSA (cm/m2): 1.7  Asc Ao diam index BSA (cm/m2): 1.6  Asc Ao diam index Ht(cm/m): 1.9  EF Biplane: 55.1 %  RWT: 0.37  TAPSE: 2.1 cm     Doppler Measurements & Calculations  MV E max reno: 74.6 cm/sec  MV A max reno: 87.8 cm/sec  MV E/A: 0.85  MV dec time: 0.21 sec  PA acc time: 0.11 sec  E/E' av.9  Lateral E/e': 7.8  Medial E/e': 9.9  RV S [place]: 11.7 cm/sec     ______________________________________________________________________________  Report approved by: MD Brice Block 2024 12:24 PM             Assessment, Plan and Long-Term Follow-Up Recommendations: Gus Guerrero is a now 39-year-old  male with a history of acute lymphoblastic leukemia that was status post bone marrow transplant approximately 20 years ago.  He has hypertension again today and strong family history.  He should discuss with his PCP and likely needs to go on medication for better control.  Lipids and hemoglobin A1C mildly elevated and we discussed healthy diet and adding in more physical activity (30 min at least 5 days a week).  Echo shows EF at 54% but aortic valve with some new calcifications.  Would recommend he see cardiology for a consult.  Discussed with Gus via phone.    The following are our late effects recommendations.     New late effects:  hypertension, pre-diabetes, mild hyperlipidemia    1. Risk of recurrence: Gus is currently 17 years post BMT for his ALL. Given the distance from the  diagnosis of his primary malignancy, the likelihood of recurrence at this point is low. Will have CBC in the near future.  Only needs to be repeated as clinically indicated.  2. Psychosocial effects, learning and memory: Gus was previously found to have neurocognitive difficulties in the area of sustained attention, executive functioning and processing speed. He had repeat neuropsychological testing in 2009 in which these areas were improved. Gus appears to be functioning well.  Should he notice any changes in his neurocognitive function then we will recommend repeat neuropsychological testing to be completed at that time. His anxiety is improved with being back on his meds.  He is seeing his PCP for regular follow up.  We also discussed that some pts can have PTSD after cancer therapy, but his seemed more like generalized anxiety.  3. Risk for cardiac toxicity: Gus was treated with anthracycline chemotherapy as well as TBI which predispose him to late cardiac dysfunction. There have also been recent studies of metabolic syndromes in childhood cancer survivors which found that BMT survivors may be predisposed to early heart disease as well. Gus was found to have high triglycerides and cholesterol during this study.  Should have lipid panel every 2 years along with Hgb A1C. Elevations noted on both studies.  Echocardiogram today showed aortic valve calcifications which are new.  Recommended cardiology consult.  4. Risk for kidney damage: Gus was exposed to multiple chemotherapeutic agents and GVHD prophylactic agents which can cause renal dysfunction. Creatinine was mildly elevated in the past but normal at last check.  We discussed refraining from high protein supplements. We would recommend that he keep himself very well hydrated and try to avoid long term NSAID use.   5. Male issues related to fertility: Gus was treated with a large amount of alkylating agent chemotherapy as well as local testicular  radiation. This has caused him to have some degree of seminiferous tubule dysfunction. We are still monitoring for leydig cell dysfunction as this can change over time. As previously found when he was enrolled in the metabolic syndrome study for childhood cancer survivors in 2008, Gus had an elevated FSH. He most recently had semen analysis completed in 2009 which did show he had no progressively motile sperm. He does have sperm banked at a cryo bank to be used in the future should he be interested in conceiving. They had 3 children via IVF.  6. Risk for secondary malignancy: Because of his prior treatment with radiation therapy, Gus may be at increased risk for developing secondary malignancies. He has a few atypical-appearing nevi that are small on his abdomen that would continue to require monitoring.  I recommended that he have a full skin exam with dermatology yearly due to his history.   We also recommended that Gus do monthly self testicular exams due to his previous radiation. Gus should also have a thyroid exam completed yearly due to his TBI.   Exam today is normal. I also discussed the updated guidelines that recommend patients that were exposed to any abdominal directed radiation have early colon cancer screening starting at 30 years old.  We discussed the options of colonoscopy every 5 years vs cologuard every 3 years.  He would prefer colonoscopy.  Done in 2019 and colon polyps found; due this year and he will arrange locally through his PCP.  7. Risk for bone density issues: Gus was previously treated with multiple steroids for his transplant which can predispose him to bone density difficulties. He had a DEXA scan completed in 2008 that was within normal limits. Repeat as needed.  8. Risk for eye toxicity: Because of Gus's exposure to total body irradiation, he is at increased risk for developing cataracts. Bilateral cataracts were noted on today's exam. It is recommended that Gus see an  ophthalmologist every 1-2 years for continued evaluation and monitoring.   9. Immune status: Gus was previously immunized for hepatitis B, however, per his mother's recollection she does not believe his titers ever produced an immune response. Currently Gus is not immune to hepatitis B since his surface antibody was completely negative. If Gus has received the hepatitis B series on two separate occasions and the antibody is negative then likely he is just not going to respond to the vaccine. Should he have an expected exposure to hepatitis B then he should see his primary care physician for treatment recommendations.  He also told me that his COVID vaccine did not produce any antibodies.  I did check immunoglobulins and T cell subsets post BMT today since they haven't been checked since 2006.  Those all came back normal so we feel that Gus's immune system is intact given his distance from transplant.  If he would like to discuss this issue more, I would recommend that he see Dr. Lance Vences with [facility].  10. Risk for thyroid damage: Gus received radiation therapy to his thyroid area which can predispose him to hypothyroidism. We would recommend he have thyroid function assessed yearly along with a physical exam. His thyroid function today was normal.  11. Osteonecrosis: Gus was previously found to have osteonecrosis of his left knee and is followed by [facility] in [place]. We recommend that he continue followup with their clinic. He currently does not appear to have any great functional limitations to his left lower extremity.  He is taking regular vitamin D supplements.        It was great to see Gus in the [facility] clinic today.  We appreciate the opportunity to participate in your patient's care. If you have any questions or concerns please do not hesitate to contact us at [phone]. We ask that Gus return to our clinic in 2 years for follow up.  I asked that he see his PCP for a yearly  physical if he is coming here every other year.  He should have his colonoscopy this year as well as cardiology referral in the near future.      Lisa Hilliard MSN, APRN, CPNP-AC, CPON  Department of Pediatrics  Division of Hematology/Oncology    Review of the result(s) of each unique test - CMP, lipids, HgbA1C, TSH, free T4, echocardiogram  Total time spent on the following services on the date of the encounter:  Preparing to see patient, chart review, review of outside records, Ordering medications, test, procedures, chemotherapy, Interpretation of labs, imaging and other tests, Performing a medically appropriate examination , Counseling and educating the patient/family/caregiver , Documenting clinical information in the electronic or other health record , Communicating results to the patient/family/caregiver , Care coordination  and Total time spent: 50 min          THUAN Lopez CNP

## 2024-04-05 NOTE — NURSING NOTE
Chief Complaint   Patient presents with    Blood Draw     Vpt blood draw by lab RN.   Sara Morgan RN

## 2024-04-05 NOTE — LETTER
4/5/2024         RE: Gus Guerrero  [address]  [facility] [address]        Dear Colleague,    Thank you for referring your patient, Gus Guerrero, to the [facility]. Please see a copy of my visit note below.    We had the pleasure of seeing your patient, Gus Guerrero, at the [facility] Long-Term Follow-Up Clinic for childhood cancer survivors. The following is a summary of your patient's cancer, therapy, current history and physical findings followed by an assessment and long-term follow-up evaluation.   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 He was high risk due to his age. He was treated at the [facility] on Protocol [id number] Regimen A and completed his initial therapy on May 6, 2002. He received the following chemotherapy for his initial regimen.   1. Cyclophosphamide intravenously with a cumulative dose of 3 gm/M2   2. Cytarabine intravenously with a cumulative dose of 2100 mg/M2   3. Cytarabine intrathecally with a cumulative dose of 47 mg/M2   4. Daunorubicin intravenously with a cumulative dose of 100 mg/M2 (anthracycline equivalent 83 mg/M2)   5. Dexamethasone orally   6. Doxorubicin intravenously with a cumulative dose of 75 mg/M2   7. L-asparaginase intramuscularly   8. 6-mercaptopurine orally   9. Methotrexate orally   10. Methotrexate intrathecally with a cumulative dose of 192 mg/M2   11. Prednisone orally   12. 6-thioguanine orally   13. Vincristine intravenously     Unfortunately, Gus was found to have a recurrence of his disease approximately two years after therapy was completed on March 24, 2004 to his bone marrow. He was treated with a relapse regimen [id number] Regimen A which was completed with his bone marrow transplantation. He received the following chemotherapy on  Regimen [id number].   1. Cyclophosphamide intravenously with a cumulative dose of 2.2 gm/M2   2. Cytarabine intravenously with a cumulative dose of 24 g/M2   3. Cytarabine intrathecally with a cumulative dose of 41 mg/M2   4. Doxorubicin intravenously with a cumulative dose of 60 mg/M2   5. L-asparaginase intramuscularly   6. Methotrexate intravenously with a cumulative dose of 5 g/M2   7. Methotrexate intrathecally with a cumulative dose of 28 mg/M2   8. PEG asparaginase intramuscularly   9. Prednisone orally   10. Vincristine intravenously   11. 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 He received the following conditioning regimen for his transplant:   1. Cyclophosphamide intravenously with a cumulative dose of 4371 mg/M2   2. Fludarabine intravenously with a cumulative dose of 78 mg/M2   3. Total body irradiation which started on July 24, 2004 and was completed on July 27, 2004 in eight fractions at 165 cGy/fraction for a total dose of 1320 cGy.   4. Testicular boost completed on July 24, 2004 in two fractions at 200 cGy/fraction for a total dose of 400 cGy.     Gus had GVHD prophylaxis with cyclosporine and MMF and steroids. He was off all immune suppression in June of 2005.     Gus's acute and chronic late effects known to date include:   1. 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   2. 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   3. Mild air trapping via PFTs July 2005 with no current symptoms.   4. High triglycerides found in February 2008.   5. Problems with sustained attention, executive functioning and processing speed initially seen in August of 2005 which were improved on recent neuropsychological testing in 2009.   6. Osteonecrosis of the left medial femoral condyle found on May 19, 2008.   7. Compound nevus resected on  September 5, 2008.   8.  Anxiety  9.  Colon polyps- found in 2019    Current Medications:   Current Outpatient Medications   Medication Sig Dispense Refill    Ascorbic Acid (VITAMIN C) 500 MG CAPS       aspirin 81 MG EC tablet Take 81 mg by mouth daily      citalopram (CELEXA) 20 MG tablet Take 20 mg by mouth daily.      rosuvastatin (CRESTOR) 5 MG tablet Take 5 mg by mouth daily      VITAMIN D 2000 UNIT PO TABS 1 TABLET DAILY      LORazepam (ATIVAN) 1 MG tablet Take 1 mg by mouth every 6 hours as needed. (Patient not taking: Reported on 3/4/2022)       No current facility-administered medications for this visit.     Gus has medication allergies to Bactrim, Biaxin, Serevent, Nuts, and Dapsone    History of Present Illness: Gus presents to the visit today alone.  His last visit with us was in March 2022.  He has been doing well since his last visit.  He has 3 children now and busy with home life and work.    No recent significant medical issues.  Did have a bilateral ear infection January 2024.  Had some immune testing in 2022 due to concerns about not mounting a response to the COVID and Hep B vaccines. Labs were normal.     He wears sunscreen regularly.    No problems with his vision.  He sees the eye dr every 2 years for cataract follow up.  Due for a visit.    No recent issues with knee AVN and seeing ortho as needed. Hasn't been exercising as much lately.  He also endorses that his diet hasn't been great.  He is planning to try to get back on track with that now. Gus's BP has been high at the last 2 dr visits.  It isn't as high when he checks it at home.  No HA, vision changes or dizziness.     No dry skin, mouth, or eyes.  He is no longer having anxiety attack symptoms like he was before.  No significant illnesses or fevers.  Has been seen by derm yearly. Had a dysplastic mole removed from his back in the past.  Did have a colonoscopy for screening post radiation 12/2019.  There were a few small polyps and  "plans to have another scope in 2024 (later this year).      Review of systems:    General:  No acute distress  Skin: history of dysplastic mole.  Eyes: cataracts; followed  by eye dr  Ears/Nose/Throat: negative  Respiratory: No SOB or increased WOB; no cough or wheezing  Cardiovascular: negative for palpitations or chest pain  Gastrointestinal: negative; no pain; No N/V/D/C  Genitourinary: negative  Musculoskeletal: left knee osteonecrosis s/p surgery 2009  Neurologic: negative  Psychiatric:  Anxiety- improved  Hematologic/Lymphatic/Immunologic: negative  Endocrine: negative    Social History: Gus completed his undergraduate degree at [employer] and was accepted to the [employer] for a combined master's degree in health policy administration and business administration.  He has completed his Masters degree.  Gus works in finance.    Gus is  with 3 children.   No tobacco or alcohol or recreational drug use. He has banked sperm.      Family History:   Family History   Problem Relation Age of Onset    Coronary Artery Disease Father     Hypertension Father     Hyperlipidemia Father     Heart Disease Father         MI    Hypertension Maternal Grandmother     Hypertension Maternal Grandfather     Heart Disease Maternal Grandfather         MI    Coronary Artery Disease Maternal Grandfather     Hypertension Paternal Grandmother     Diabetes Paternal Grandmother     Ovarian Cancer Paternal Grandmother     Arrhythmia Paternal Grandmother     Heart Disease Paternal Grandfather         heart palpitation    Basal cell carcinoma Paternal Grandfather     Hypertension Paternal Grandfather     Hypertension Brother     Leukemia Cousin     Basal cell carcinoma Paternal Aunt          Physical Examination: BP (!) 154/102 (BP Location: Right arm, Patient Position: Chair, Cuff Size: Adult Large)   Pulse 77   Temp 98.2  F (36.8  C)   Resp 16   Ht 1.782 m (5' 10.16\")   Wt 80.1 kg (176 lb 8 oz)   SpO2 100%   BMI " 25.21 kg/m   no red flag symptoms with BP.    In general, Gus appears to be in no acute distress. He is normocephalic/atraumatic. His sclera are anicteric. Pupils are equally round and reactive to light and accommodation. Extraocular movements are intact. Funduscopic exam reveal moderate bilateral posterior subcapsular cataracts (stable compared with prior). Bilateral tympanic membranes are within normal limits. The oropharynx is clear without lesions. Neck is supple. Thyroid non-palpable.   Lungs clear to auscultation bilaterally. The heart is of regular rate and rhythm with no murmurs, rubs or gallops. The abdomen is non-distended, non-tender and soft. No hepatosplenomegaly or palpable masses are noted.    exam deferred.  Musculoskeletal exam reveals gait within normal limits.  Muscular strength 5/5 throughout the lower extremities, no edema or effusion noted to the patient's left knee. Skin exam reveals multiple striae to the patient's abdomen.   There are small atypical nevi to the patient's abdomen. Patient has no palpable cervical or inguinal lymphadenopathy.     Laboratory Studies:   Results for orders placed or performed in visit on 04/05/24   Hemoglobin A1c     Status: Abnormal   Result Value Ref Range    Hemoglobin A1C 5.7 (H) <5.7 %   Lipid panel reflex to direct LDL Fasting     Status: Abnormal   Result Value Ref Range    Cholesterol 213 (H) <200 mg/dL    Triglycerides 228 (H) <150 mg/dL    Direct Measure HDL 48 >=40 mg/dL    LDL Cholesterol Calculated 119 (H) <=100 mg/dL    Non HDL Cholesterol 165 (H) <130 mg/dL    Patient Fasting > 8hrs? Yes     Narrative    Cholesterol  Desirable:  <200 mg/dL    Triglycerides  Normal:  Less than 150 mg/dL  Borderline High:  150-199 mg/dL  High:  200-499 mg/dL  Very High:  Greater than or equal to 500 mg/dL    Direct Measure HDL  Female:  Greater than or equal to 50 mg/dL   Male:  Greater than or equal to 40 mg/dL    LDL Cholesterol  Desirable:  <100mg/dL  Above  Desirable:  100-129 mg/dL   Borderline High:  130-159 mg/dL   High:  160-189 mg/dL   Very High:  >= 190 mg/dL    Non HDL Cholesterol  Desirable:  130 mg/dL  Above Desirable:  130-159 mg/dL  Borderline High:  160-189 mg/dL  High:  190-219 mg/dL  Very High:  Greater than or equal to 220 mg/dL   Comprehensive metabolic panel     Status: Abnormal   Result Value Ref Range    Sodium 141 135 - 145 mmol/L    Potassium 4.1 3.4 - 5.3 mmol/L    Carbon Dioxide (CO2) 25 22 - 29 mmol/L    Anion Gap 12 7 - 15 mmol/L    Urea Nitrogen 22.2 (H) 6.0 - 20.0 mg/dL    Creatinine 1.16 0.67 - 1.17 mg/dL    GFR Estimate 83 >60 mL/min/1.73m2    Calcium 10.1 (H) 8.6 - 10.0 mg/dL    Chloride 104 98 - 107 mmol/L    Glucose 86 70 - 99 mg/dL    Alkaline Phosphatase 90 40 - 150 U/L    AST 22 0 - 45 U/L    ALT 30 0 - 70 U/L    Protein Total 7.8 6.4 - 8.3 g/dL    Albumin 4.4 3.5 - 5.2 g/dL    Bilirubin Total 1.6 (H) <=1.2 mg/dL   TSH with free T4 reflex     Status: Normal   Result Value Ref Range    TSH 2.65 0.30 - 4.20 uIU/mL   Results for orders placed or performed in visit on 24   Echocardiogram Complete     Status: None   Result Value Ref Range    Biplane LVEF 54%     Narrative    [id number]  [id number]  [id number]  [id number]^PHAM^TOMASZ^LANDEN     [facility]  Diagnostic and Treatment-3rd Floor  [address] [place] [id number]     Name: SHANTEL MARTIN  MRN: [id number]  : 1985  Study Date: 2024 09:52 AM  Age: 39 yrs  Gender: Male  Patient Location: [facility]  Reason For Study: Status post bone marrow transplant (H), Hx of radiation  therapy,  Ordering Physician: TOMASZ NATH  Referring Physician: TOMASZ NATH  Performed By: Kira Silveira RDCS     BSA: 2.0 m2  Height: 70 in  Weight: 190 lb  HR: 68  BP: 154/101 mmHg  ______________________________________________________________________________  Procedure  Echocardiogram with two-dimensional, color and spectral Doppler  performed.  ______________________________________________________________________________  Interpretation Summary  Biplane LVEF is 54%. Global peak LV longitudinal strain is averaged at -18.2%.  This is within reported normal limits (normal <-18%).  Right ventricular function, chamber size, wall motion, and thickness are  normal.  Moderate aortic valve calcification is present, no evidence of stenosis.  IVC diameter <2.1 cm collapsing >50% with sniff suggests a normal RA pressure  of 3 mmHg.  No pericardial effusion is present.     This study was compared with the study from 8/27/19. No significant changes.     ______________________________________________________________________________  Left Ventricle  Left ventricular size is normal. Left ventricular wall thickness is normal.  Biplane LVEF is 54%. Left ventricular diastolic function is normal. Global  peak LV longitudinal strain is averaged at -18.2%. This is within reported  normal limits (normal <-18%). No regional wall motion abnormalities are seen.     Right Ventricle  Right ventricular function, chamber size, wall motion, and thickness are  normal.     Atria  Both atria appear normal.     Mitral Valve  The mitral valve is normal. Mitral leaflet thickness is normal. Trace mitral  insufficiency is present.     Aortic Valve  The aortic valve is tricuspid. Moderate aortic valve calcification is present.  On Doppler interrogation, there is no significant stenosis or regurgitation.     Tricuspid Valve  Trace tricuspid insufficiency is present. The peak velocity of the tricuspid  regurgitant jet is not obtainable. Pulmonary artery systolic pressure cannot  be assessed.     Pulmonic Valve  The valve leaflets are not well visualized. On Doppler interrogation, there is  no significant stenosis or regurgitation.     Vessels  Sinuses of Valsalva 3.5 cm. Ascending aorta 3.3 cm. IVC diameter <2.1 cm  collapsing >50% with sniff suggests a normal RA pressure of 3 mmHg.      Pericardium  No pericardial effusion is present.     Compared to Previous Study  This study was compared with the study from 19 .     Attestation  I have personally viewed the imaging and agree with the interpretation and  report as documented by the fellow, Tyler Streeter, and/or edited by me.  ______________________________________________________________________________  MMode/2D Measurements & Calculations  IVSd: 0.93 cm  LVIDd: 4.5 cm  LVIDs: 3.6 cm  LVPWd: 0.83 cm  FS: 20.6 %  LV mass(C)d: 129.2 grams  LV mass(C)dI: 63.2 grams/m2  Ao root diam: 3.5 cm  asc Aorta Diam: 3.4 cm  LVOT diam: 2.2 cm  LVOT area: 4.0 cm2  Ao root diam index Ht(cm/m): 2.0  Ao root diam index BSA (cm/m2): 1.7  Asc Ao diam index BSA (cm/m2): 1.6  Asc Ao diam index Ht(cm/m): 1.9  EF Biplane: 55.1 %  RWT: 0.37  TAPSE: 2.1 cm     Doppler Measurements & Calculations  MV E max reno: 74.6 cm/sec  MV A max reno: 87.8 cm/sec  MV E/A: 0.85  MV dec time: 0.21 sec  PA acc time: 0.11 sec  E/E' av.9  Lateral E/e': 7.8  Medial E/e': 9.9  RV S [place]: 11.7 cm/sec     ______________________________________________________________________________  Report approved by: MD Brice Block 2024 12:24 PM             Assessment, Plan and Long-Term Follow-Up Recommendations: Gus Guerrero is a now 39-year-old  male with a history of acute lymphoblastic leukemia that was status post bone marrow transplant approximately 20 years ago.  He has hypertension again today and strong family history.  He should discuss with his PCP and likely needs to go on medication for better control.  Lipids and hemoglobin A1C mildly elevated and we discussed healthy diet and adding in more physical activity (30 min at least 5 days a week).  Echo shows EF at 54% but aortic valve with some new calcifications.  Would recommend he see cardiology for a consult.  Discussed with Gus via phone.    The following are our late effects recommendations.      New late effects:  hypertension, pre-diabetes, mild hyperlipidemia    1. Risk of recurrence: Gus is currently 17 years post BMT for his ALL. Given the distance from the diagnosis of his primary malignancy, the likelihood of recurrence at this point is low. Will have CBC in the near future.  Only needs to be repeated as clinically indicated.  2. Psychosocial effects, learning and memory: Gus was previously found to have neurocognitive difficulties in the area of sustained attention, executive functioning and processing speed. He had repeat neuropsychological testing in 2009 in which these areas were improved. Gus appears to be functioning well.  Should he notice any changes in his neurocognitive function then we will recommend repeat neuropsychological testing to be completed at that time. His anxiety is improved with being back on his meds.  He is seeing his PCP for regular follow up.  We also discussed that some pts can have PTSD after cancer therapy, but his seemed more like generalized anxiety.  3. Risk for cardiac toxicity: Gus was treated with anthracycline chemotherapy as well as TBI which predispose him to late cardiac dysfunction. There have also been recent studies of metabolic syndromes in childhood cancer survivors which found that BMT survivors may be predisposed to early heart disease as well. Gus was found to have high triglycerides and cholesterol during this study.  Should have lipid panel every 2 years along with Hgb A1C. Elevations noted on both studies.  Echocardiogram today showed aortic valve calcifications which are new.  Recommended cardiology consult.  4. Risk for kidney damage: Gus was exposed to multiple chemotherapeutic agents and GVHD prophylactic agents which can cause renal dysfunction. Creatinine was mildly elevated in the past but normal at last check.  We discussed refraining from high protein supplements. We would recommend that he keep himself very well hydrated and try  to avoid long term NSAID use.   5. Male issues related to fertility: Gus was treated with a large amount of alkylating agent chemotherapy as well as local testicular radiation. This has caused him to have some degree of seminiferous tubule dysfunction. We are still monitoring for leydig cell dysfunction as this can change over time. As previously found when he was enrolled in the metabolic syndrome study for childhood cancer survivors in 2008, Gus had an elevated FSH. He most recently had semen analysis completed in 2009 which did show he had no progressively motile sperm. He does have sperm banked at a cryo bank to be used in the future should he be interested in conceiving. They had 3 children via IVF.  6. Risk for secondary malignancy: Because of his prior treatment with radiation therapy, Gus may be at increased risk for developing secondary malignancies. He has a few atypical-appearing nevi that are small on his abdomen that would continue to require monitoring.  I recommended that he have a full skin exam with dermatology yearly due to his history.   We also recommended that Gus do monthly self testicular exams due to his previous radiation. Gus should also have a thyroid exam completed yearly due to his TBI.   Exam today is normal. I also discussed the updated guidelines that recommend patients that were exposed to any abdominal directed radiation have early colon cancer screening starting at 30 years old.  We discussed the options of colonoscopy every 5 years vs cologuard every 3 years.  He would prefer colonoscopy.  Done in 2019 and colon polyps found; due this year and he will arrange locally through his PCP.  7. Risk for bone density issues: Gus was previously treated with multiple steroids for his transplant which can predispose him to bone density difficulties. He had a DEXA scan completed in 2008 that was within normal limits. Repeat as needed.  8. Risk for eye toxicity: Because of Gus's  exposure to total body irradiation, he is at increased risk for developing cataracts. Bilateral cataracts were noted on today's exam. It is recommended that Gus see an ophthalmologist every 1-2 years for continued evaluation and monitoring.   9. Immune status: Gus was previously immunized for hepatitis B, however, per his mother's recollection she does not believe his titers ever produced an immune response. Currently Gus is not immune to hepatitis B since his surface antibody was completely negative. If Gus has received the hepatitis B series on two separate occasions and the antibody is negative then likely he is just not going to respond to the vaccine. Should he have an expected exposure to hepatitis B then he should see his primary care physician for treatment recommendations.  He also told me that his COVID vaccine did not produce any antibodies.  I did check immunoglobulins and T cell subsets post BMT today since they haven't been checked since 2006.  Those all came back normal so we feel that Gus's immune system is intact given his distance from transplant.  If he would like to discuss this issue more, I would recommend that he see Dr. Lance Vences with [facility].  10. Risk for thyroid damage: Gus received radiation therapy to his thyroid area which can predispose him to hypothyroidism. We would recommend he have thyroid function assessed yearly along with a physical exam. His thyroid function today was normal.  11. Osteonecrosis: Gus was previously found to have osteonecrosis of his left knee and is followed by Orthopedics in [place]. We recommend that he continue followup with their clinic. He currently does not appear to have any great functional limitations to his left lower extremity.  He is taking regular vitamin D supplements.        It was great to see Gus in the [facility] clinic today.  We appreciate the opportunity to participate in your patient's care. If you have any questions or  concerns please do not hesitate to contact us at [phone]. We ask that Gus return to our clinic in 2 years for follow up.  I asked that he see his PCP for a yearly physical if he is coming here every other year.  He should have his colonoscopy this year as well as cardiology referral in the near future.      Lisa Hilliard MSN, APRN, CPNP-AC, CPON  Department of Pediatrics  Division of Hematology/Oncology    Review of the result(s) of each unique test - CMP, lipids, HgbA1C, TSH, free T4, echocardiogram  Total time spent on the following services on the date of the encounter:  Preparing to see patient, chart review, review of outside records, Ordering medications, test, procedures, chemotherapy, Interpretation of labs, imaging and other tests, Performing a medically appropriate examination , Counseling and educating the patient/family/caregiver , Documenting clinical information in the electronic or other health record , Communicating results to the patient/family/caregiver , Care coordination  and Total time spent: 50 min

## 2024-04-05 NOTE — NURSING NOTE
"Oncology Rooming Note    April 5, 2024 11:27 AM   Gus Guerrero is a 39 year old male who presents for:    Chief Complaint   Patient presents with    Oncology Clinic Visit     [facility] RETURN - AML     Initial Vitals: BP (!) 154/102 (BP Location: Right arm, Patient Position: Chair, Cuff Size: Adult Large)   Pulse 77   Temp 98.2  F (36.8  C)   Resp 16   Ht 1.782 m (5' 10.16\")   Wt 80.1 kg (176 lb 8 oz)   SpO2 100%   BMI 25.21 kg/m   Estimated body mass index is 25.21 kg/m  as calculated from the following:    Height as of this encounter: 1.782 m (5' 10.16\").    Weight as of this encounter: 80.1 kg (176 lb 8 oz). Body surface area is 1.99 meters squared.  No Pain (0) Comment: Data Unavailable   No LMP for male patient.  Allergies reviewed: Yes  Medications reviewed: Yes    Medications: Medication refills not needed today.  Pharmacy name entered into [employer]:    [facility] [place] [facility] [address]  [id number] - [place], [place] - [address] [place] [address]    Frailty Screening:   Is the patient here for a new oncology consult visit in cancer care? 2. No      Davin Gary LPN              "

## 2024-04-09 NOTE — PROGRESS NOTES
We had the pleasure of seeing your patient, Gus Guerrero, at the [facility] for childhood cancer survivors. The following is a summary of your patient's cancer, therapy, current history and physical findings followed by an assessment and long-term follow-up evaluation.   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 He was high risk due to his age. He was treated at the [facility] on Protocol [id number] Regimen A and completed his initial therapy on May 6, 2002. He received the following chemotherapy for his initial regimen.   1. Cyclophosphamide intravenously with a cumulative dose of 3 gm/M2   2. Cytarabine intravenously with a cumulative dose of 2100 mg/M2   3. Cytarabine intrathecally with a cumulative dose of 47 mg/M2   4. Daunorubicin intravenously with a cumulative dose of 100 mg/M2 (anthracycline equivalent 83 mg/M2)   5. Dexamethasone orally   6. Doxorubicin intravenously with a cumulative dose of 75 mg/M2   7. L-asparaginase intramuscularly   8. 6-mercaptopurine orally   9. Methotrexate orally   10. Methotrexate intrathecally with a cumulative dose of 192 mg/M2   11. Prednisone orally   12. 6-thioguanine orally   13. Vincristine intravenously     Unfortunately, Gus was found to have a recurrence of his disease approximately two years after therapy was completed on March 24, 2004 to his bone marrow. He was treated with a relapse regimen [id number] Regimen A which was completed with his bone marrow transplantation. He received the following chemotherapy on Regimen [id number].   1. Cyclophosphamide intravenously with a cumulative dose of 2.2 gm/M2   2. Cytarabine intravenously with a cumulative dose of 24 g/M2   3. Cytarabine intrathecally with a cumulative dose of 41 mg/M2   4. Doxorubicin intravenously  with a cumulative dose of 60 mg/M2   5. L-asparaginase intramuscularly   6. Methotrexate intravenously with a cumulative dose of 5 g/M2   7. Methotrexate intrathecally with a cumulative dose of 28 mg/M2   8. PEG asparaginase intramuscularly   9. Prednisone orally   10. Vincristine intravenously   11. 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 He received the following conditioning regimen for his transplant:   1. Cyclophosphamide intravenously with a cumulative dose of 4371 mg/M2   2. Fludarabine intravenously with a cumulative dose of 78 mg/M2   3. Total body irradiation which started on July 24, 2004 and was completed on July 27, 2004 in eight fractions at 165 cGy/fraction for a total dose of 1320 cGy.   4. Testicular boost completed on July 24, 2004 in two fractions at 200 cGy/fraction for a total dose of 400 cGy.     Gus had GVHD prophylaxis with cyclosporine and MMF and steroids. He was off all immune suppression in June of 2005.     Gus's acute and chronic late effects known to date include:   1. 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   2. 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   3. Mild air trapping via PFTs July 2005 with no current symptoms.   4. High triglycerides found in February 2008.   5. Problems with sustained attention, executive functioning and processing speed initially seen in August of 2005 which were improved on recent neuropsychological testing in 2009.   6. Osteonecrosis of the left medial femoral condyle found on May 19, 2008.   7. Compound nevus resected on September 5, 2008.   8.  Anxiety  9.  Colon polyps- found in 2019    Current Medications:   Current Outpatient Medications   Medication Sig Dispense Refill    Ascorbic Acid (VITAMIN C) 500 MG CAPS       aspirin 81 MG EC tablet Take 81 mg by mouth daily       citalopram (CELEXA) 20 MG tablet Take 20 mg by mouth daily.      rosuvastatin (CRESTOR) 5 MG tablet Take 5 mg by mouth daily      VITAMIN D 2000 UNIT PO TABS 1 TABLET DAILY      LORazepam (ATIVAN) 1 MG tablet Take 1 mg by mouth every 6 hours as needed. (Patient not taking: Reported on 3/4/2022)       No current facility-administered medications for this visit.     Gus has medication allergies to Bactrim, Biaxin, Serevent, Nuts, and Dapsone    History of Present Illness: Gus presents to the visit today alone.  His last visit with us was in March 2022.  He has been doing well since his last visit.  He has 3 children now and busy with home life and work.    No recent significant medical issues.  Did have a bilateral ear infection January 2024.  Had some immune testing in 2022 due to concerns about not mounting a response to the COVID and Hep B vaccines. Labs were normal.     He wears sunscreen regularly.    No problems with his vision.  He sees the eye dr every 2 years for cataract follow up.  Due for a visit.    No recent issues with knee AVN and seeing ortho as needed. Hasn't been exercising as much lately.  He also endorses that his diet hasn't been great.  He is planning to try to get back on track with that now. Gus's BP has been high at the last 2 dr visits.  It isn't as high when he checks it at home.  No HA, vision changes or dizziness.     No dry skin, mouth, or eyes.  He is no longer having anxiety attack symptoms like he was before.  No significant illnesses or fevers.  Has been seen by derm yearly. Had a dysplastic mole removed from his back in the past.  Did have a colonoscopy for screening post radiation 12/2019.  There were a few small polyps and plans to have another scope in 2024 (later this year).      Review of systems:    General:  No acute distress  Skin: history of dysplastic mole.  Eyes: cataracts; followed  by eye dr  Ears/Nose/Throat: negative  Respiratory: No SOB or increased WOB; no  "cough or wheezing  Cardiovascular: negative for palpitations or chest pain  Gastrointestinal: negative; no pain; No N/V/D/C  Genitourinary: negative  Musculoskeletal: left knee osteonecrosis s/p surgery 2009  Neurologic: negative  Psychiatric:  Anxiety- improved  Hematologic/Lymphatic/Immunologic: negative  Endocrine: negative    Social History: Gus completed his undergraduate degree at [employer] and was accepted to the [employer] for a combined master's degree in health policy administration and business administration.  He has completed his Masters degree.  Gus works in finance.    Gus is  with 3 children.   No tobacco or alcohol or recreational drug use. He has banked sperm.      Family History:   Family History   Problem Relation Age of Onset    Coronary Artery Disease Father     Hypertension Father     Hyperlipidemia Father     Heart Disease Father         MI    Hypertension Maternal Grandmother     Hypertension Maternal Grandfather     Heart Disease Maternal Grandfather         MI    Coronary Artery Disease Maternal Grandfather     Hypertension Paternal Grandmother     Diabetes Paternal Grandmother     Ovarian Cancer Paternal Grandmother     Arrhythmia Paternal Grandmother     Heart Disease Paternal Grandfather         heart palpitation    Basal cell carcinoma Paternal Grandfather     Hypertension Paternal Grandfather     Hypertension Brother     Leukemia Cousin     Basal cell carcinoma Paternal Aunt          Physical Examination: BP (!) 154/102 (BP Location: Right arm, Patient Position: Chair, Cuff Size: Adult Large)   Pulse 77   Temp 98.2  F (36.8  C)   Resp 16   Ht 1.782 m (5' 10.16\")   Wt 80.1 kg (176 lb 8 oz)   SpO2 100%   BMI 25.21 kg/m   no red flag symptoms with BP.    In general, Gus appears to be in no acute distress. He is normocephalic/atraumatic. His sclera are anicteric. Pupils are equally round and reactive to light and accommodation. Extraocular movements are " intact. Funduscopic exam reveal moderate bilateral posterior subcapsular cataracts (stable compared with prior). Bilateral tympanic membranes are within normal limits. The oropharynx is clear without lesions. Neck is supple. Thyroid non-palpable.   Lungs clear to auscultation bilaterally. The heart is of regular rate and rhythm with no murmurs, rubs or gallops. The abdomen is non-distended, non-tender and soft. No hepatosplenomegaly or palpable masses are noted.    exam deferred.  Musculoskeletal exam reveals gait within normal limits.  Muscular strength 5/5 throughout the lower extremities, no edema or effusion noted to the patient's left knee. Skin exam reveals multiple striae to the patient's abdomen.   There are small atypical nevi to the patient's abdomen. Patient has no palpable cervical or inguinal lymphadenopathy.     Laboratory Studies:   Results for orders placed or performed in visit on 04/05/24   Hemoglobin A1c     Status: Abnormal   Result Value Ref Range    Hemoglobin A1C 5.7 (H) <5.7 %   Lipid panel reflex to direct LDL Fasting     Status: Abnormal   Result Value Ref Range    Cholesterol 213 (H) <200 mg/dL    Triglycerides 228 (H) <150 mg/dL    Direct Measure HDL 48 >=40 mg/dL    LDL Cholesterol Calculated 119 (H) <=100 mg/dL    Non HDL Cholesterol 165 (H) <130 mg/dL    Patient Fasting > 8hrs? Yes     Narrative    Cholesterol  Desirable:  <200 mg/dL    Triglycerides  Normal:  Less than 150 mg/dL  Borderline High:  150-199 mg/dL  High:  200-499 mg/dL  Very High:  Greater than or equal to 500 mg/dL    Direct Measure HDL  Female:  Greater than or equal to 50 mg/dL   Male:  Greater than or equal to 40 mg/dL    LDL Cholesterol  Desirable:  <100mg/dL  Above Desirable:  100-129 mg/dL   Borderline High:  130-159 mg/dL   High:  160-189 mg/dL   Very High:  >= 190 mg/dL    Non HDL Cholesterol  Desirable:  130 mg/dL  Above Desirable:  130-159 mg/dL  Borderline High:  160-189 mg/dL  High:  190-219 mg/dL  Very  High:  Greater than or equal to 220 mg/dL   Comprehensive metabolic panel     Status: Abnormal   Result Value Ref Range    Sodium 141 135 - 145 mmol/L    Potassium 4.1 3.4 - 5.3 mmol/L    Carbon Dioxide (CO2) 25 22 - 29 mmol/L    Anion Gap 12 7 - 15 mmol/L    Urea Nitrogen 22.2 (H) 6.0 - 20.0 mg/dL    Creatinine 1.16 0.67 - 1.17 mg/dL    GFR Estimate 83 >60 mL/min/1.73m2    Calcium 10.1 (H) 8.6 - 10.0 mg/dL    Chloride 104 98 - 107 mmol/L    Glucose 86 70 - 99 mg/dL    Alkaline Phosphatase 90 40 - 150 U/L    AST 22 0 - 45 U/L    ALT 30 0 - 70 U/L    Protein Total 7.8 6.4 - 8.3 g/dL    Albumin 4.4 3.5 - 5.2 g/dL    Bilirubin Total 1.6 (H) <=1.2 mg/dL   TSH with free T4 reflex     Status: Normal   Result Value Ref Range    TSH 2.65 0.30 - 4.20 uIU/mL   Results for orders placed or performed in visit on 24   Echocardiogram Complete     Status: None   Result Value Ref Range    Biplane LVEF 54%     Narrative    [id number]  [id number]  [id number]  [id number]^PHAM^TOMASZ^LANDEN     [facility]  Diagnostic and Treatment-3rd Floor  [address] [place] [id number]     Name: SHANTEL MARTIN  MRN: [id number]  : 1985  Study Date: 2024 09:52 AM  Age: 39 yrs  Gender: Male  Patient Location: [facility]  Reason For Study: Status post bone marrow transplant (H), Hx of radiation  therapy,  Ordering Physician: TOMASZ NATH  Referring Physician: TOMASZ NATH  Performed By: Kira Silveira RDCS     BSA: 2.0 m2  Height: 70 in  Weight: 190 lb  HR: 68  BP: 154/101 mmHg  ______________________________________________________________________________  Procedure  Echocardiogram with two-dimensional, color and spectral Doppler performed.  ______________________________________________________________________________  Interpretation Summary  Biplane LVEF is 54%. Global peak LV longitudinal strain is averaged at -18.2%.  This is within reported normal limits (normal <-18%).  Right ventricular  function, chamber size, wall motion, and thickness are  normal.  Moderate aortic valve calcification is present, no evidence of stenosis.  IVC diameter <2.1 cm collapsing >50% with sniff suggests a normal RA pressure  of 3 mmHg.  No pericardial effusion is present.     This study was compared with the study from 8/27/19. No significant changes.     ______________________________________________________________________________  Left Ventricle  Left ventricular size is normal. Left ventricular wall thickness is normal.  Biplane LVEF is 54%. Left ventricular diastolic function is normal. Global  peak LV longitudinal strain is averaged at -18.2%. This is within reported  normal limits (normal <-18%). No regional wall motion abnormalities are seen.     Right Ventricle  Right ventricular function, chamber size, wall motion, and thickness are  normal.     Atria  Both atria appear normal.     Mitral Valve  The mitral valve is normal. Mitral leaflet thickness is normal. Trace mitral  insufficiency is present.     Aortic Valve  The aortic valve is tricuspid. Moderate aortic valve calcification is present.  On Doppler interrogation, there is no significant stenosis or regurgitation.     Tricuspid Valve  Trace tricuspid insufficiency is present. The peak velocity of the tricuspid  regurgitant jet is not obtainable. Pulmonary artery systolic pressure cannot  be assessed.     Pulmonic Valve  The valve leaflets are not well visualized. On Doppler interrogation, there is  no significant stenosis or regurgitation.     Vessels  Sinuses of Valsalva 3.5 cm. Ascending aorta 3.3 cm. IVC diameter <2.1 cm  collapsing >50% with sniff suggests a normal RA pressure of 3 mmHg.     Pericardium  No pericardial effusion is present.     Compared to Previous Study  This study was compared with the study from 8/27/19 .     Attestation  I have personally viewed the imaging and agree with the interpretation and  report as documented by the fellow,  Tyler Streeter, and/or edited by me.  ______________________________________________________________________________  MMode/2D Measurements & Calculations  IVSd: 0.93 cm  LVIDd: 4.5 cm  LVIDs: 3.6 cm  LVPWd: 0.83 cm  FS: 20.6 %  LV mass(C)d: 129.2 grams  LV mass(C)dI: 63.2 grams/m2  Ao root diam: 3.5 cm  asc Aorta Diam: 3.4 cm  LVOT diam: 2.2 cm  LVOT area: 4.0 cm2  Ao root diam index Ht(cm/m): 2.0  Ao root diam index BSA (cm/m2): 1.7  Asc Ao diam index BSA (cm/m2): 1.6  Asc Ao diam index Ht(cm/m): 1.9  EF Biplane: 55.1 %  RWT: 0.37  TAPSE: 2.1 cm     Doppler Measurements & Calculations  MV E max reno: 74.6 cm/sec  MV A max reno: 87.8 cm/sec  MV E/A: 0.85  MV dec time: 0.21 sec  PA acc time: 0.11 sec  E/E' av.9  Lateral E/e': 7.8  Medial E/e': 9.9  RV S [place]: 11.7 cm/sec     ______________________________________________________________________________  Report approved by: MD Brice Block 2024 12:24 PM             Assessment, Plan and Long-Term Follow-Up Recommendations: Gus Guerrero is a now 39-year-old  male with a history of acute lymphoblastic leukemia that was status post bone marrow transplant approximately 20 years ago.  He has hypertension again today and strong family history.  He should discuss with his PCP and likely needs to go on medication for better control.  Lipids and hemoglobin A1C mildly elevated and we discussed healthy diet and adding in more physical activity (30 min at least 5 days a week).  Echo shows EF at 54% but aortic valve with some new calcifications.  Would recommend he see cardiology for a consult.  Discussed with Gus via phone.    The following are our late effects recommendations.     New late effects:  hypertension, pre-diabetes, mild hyperlipidemia    1. Risk of recurrence: Gus is currently 17 years post BMT for his ALL. Given the distance from the diagnosis of his primary malignancy, the likelihood of recurrence at this point is low. Will  have CBC in the near future.  Only needs to be repeated as clinically indicated.  2. Psychosocial effects, learning and memory: Gus was previously found to have neurocognitive difficulties in the area of sustained attention, executive functioning and processing speed. He had repeat neuropsychological testing in 2009 in which these areas were improved. Gus appears to be functioning well.  Should he notice any changes in his neurocognitive function then we will recommend repeat neuropsychological testing to be completed at that time. His anxiety is improved with being back on his meds.  He is seeing his PCP for regular follow up.  We also discussed that some pts can have PTSD after cancer therapy, but his seemed more like generalized anxiety.  3. Risk for cardiac toxicity: Gus was treated with anthracycline chemotherapy as well as TBI which predispose him to late cardiac dysfunction. There have also been recent studies of metabolic syndromes in childhood cancer survivors which found that BMT survivors may be predisposed to early heart disease as well. Gus was found to have high triglycerides and cholesterol during this study.  Should have lipid panel every 2 years along with Hgb A1C. Elevations noted on both studies.  Echocardiogram today showed aortic valve calcifications which are new.  Recommended cardiology consult.  4. Risk for kidney damage: Gus was exposed to multiple chemotherapeutic agents and GVHD prophylactic agents which can cause renal dysfunction. Creatinine was mildly elevated in the past but normal at last check.  We discussed refraining from high protein supplements. We would recommend that he keep himself very well hydrated and try to avoid long term NSAID use.   5. Male issues related to fertility: Gus was treated with a large amount of alkylating agent chemotherapy as well as local testicular radiation. This has caused him to have some degree of seminiferous tubule dysfunction. We are still  monitoring for leydig cell dysfunction as this can change over time. As previously found when he was enrolled in the metabolic syndrome study for childhood cancer survivors in 2008, Gus had an elevated FSH. He most recently had semen analysis completed in 2009 which did show he had no progressively motile sperm. He does have sperm banked at a cryo bank to be used in the future should he be interested in conceiving. They had 3 children via IVF.  6. Risk for secondary malignancy: Because of his prior treatment with radiation therapy, Gus may be at increased risk for developing secondary malignancies. He has a few atypical-appearing nevi that are small on his abdomen that would continue to require monitoring.  I recommended that he have a full skin exam with dermatology yearly due to his history.   We also recommended that Gus do monthly self testicular exams due to his previous radiation. Gus should also have a thyroid exam completed yearly due to his TBI.   Exam today is normal. I also discussed the updated guidelines that recommend patients that were exposed to any abdominal directed radiation have early colon cancer screening starting at 30 years old.  We discussed the options of colonoscopy every 5 years vs cologuard every 3 years.  He would prefer colonoscopy.  Done in 2019 and colon polyps found; due this year and he will arrange locally through his PCP.  7. Risk for bone density issues: Gus was previously treated with multiple steroids for his transplant which can predispose him to bone density difficulties. He had a DEXA scan completed in 2008 that was within normal limits. Repeat as needed.  8. Risk for eye toxicity: Because of Gus's exposure to total body irradiation, he is at increased risk for developing cataracts. Bilateral cataracts were noted on today's exam. It is recommended that Gus see an ophthalmologist every 1-2 years for continued evaluation and monitoring.   9. Immune status: Gus was  previously immunized for hepatitis B, however, per his mother's recollection she does not believe his titers ever produced an immune response. Currently Gus is not immune to hepatitis B since his surface antibody was completely negative. If Gus has received the hepatitis B series on two separate occasions and the antibody is negative then likely he is just not going to respond to the vaccine. Should he have an expected exposure to hepatitis B then he should see his primary care physician for treatment recommendations.  He also told me that his COVID vaccine did not produce any antibodies.  I did check immunoglobulins and T cell subsets post BMT today since they haven't been checked since 2006.  Those all came back normal so we feel that Gus's immune system is intact given his distance from transplant.  If he would like to discuss this issue more, I would recommend that he see Dr. Lance Vences with [facility].  10. Risk for thyroid damage: Gus received radiation therapy to his thyroid area which can predispose him to hypothyroidism. We would recommend he have thyroid function assessed yearly along with a physical exam. His thyroid function today was normal.  11. Osteonecrosis: Gus was previously found to have osteonecrosis of his left knee and is followed by Orthopedics in [place]. We recommend that he continue followup with their clinic. He currently does not appear to have any great functional limitations to his left lower extremity.  He is taking regular vitamin D supplements.        It was great to see Gus in the [facility] clinic today.  We appreciate the opportunity to participate in your patient's care. If you have any questions or concerns please do not hesitate to contact us at [phone]. We ask that Gus return to our clinic in 2 years for follow up.  I asked that he see his PCP for a yearly physical if he is coming here every other year.  He should have his colonoscopy this year as well as  cardiology referral in the near future.      Lisa Hilliard MSN, APRN, CPNP-AC, CPON  Department of Pediatrics  Division of Hematology/Oncology    Review of the result(s) of each unique test - CMP, lipids, HgbA1C, TSH, free T4, echocardiogram  Total time spent on the following services on the date of the encounter:  Preparing to see patient, chart review, review of outside records, Ordering medications, test, procedures, chemotherapy, Interpretation of labs, imaging and other tests, Performing a medically appropriate examination , Counseling and educating the patient/family/caregiver , Documenting clinical information in the electronic or other health record , Communicating results to the patient/family/caregiver , Care coordination  and Total time spent: 50 min

## 2024-04-12 ENCOUNTER — TELEPHONE (OUTPATIENT)
Dept: CARDIOLOGY | Facility: CLINIC | Age: 39
End: 2024-04-12
Payer: COMMERCIAL

## 2024-04-12 NOTE — TELEPHONE ENCOUNTER
Patient confirmed scheduled appointment:  Date: 7/1/24  Time: 3:00   Visit type: New Preventive   Provider: Hung  Location: [facility]  Testing/imaging:   Additional notes:

## 2024-04-24 ENCOUNTER — TELEPHONE (OUTPATIENT)
Dept: CARDIOLOGY | Facility: CLINIC | Age: 39
End: 2024-04-24
Payer: COMMERCIAL

## 2024-04-24 NOTE — TELEPHONE ENCOUNTER
Patient confirmed scheduled appointment:  Date: 7/15  Time: 11:00  Visit type: New Preventative   Provider: Hung  Location: [facility]  Testing/imaging:   Additional notes:

## 2024-05-06 ENCOUNTER — APPOINTMENT (OUTPATIENT)
Dept: URBAN - METROPOLITAN AREA CLINIC 257 | Age: 39
Setting detail: DERMATOLOGY
End: 2024-05-06

## 2024-05-06 DIAGNOSIS — D22 MELANOCYTIC NEVI: ICD-10-CM

## 2024-05-06 DIAGNOSIS — L82.1 OTHER SEBORRHEIC KERATOSIS: ICD-10-CM

## 2024-05-06 DIAGNOSIS — Z71.89 OTHER SPECIFIED COUNSELING: ICD-10-CM

## 2024-05-06 DIAGNOSIS — D18.0 HEMANGIOMA: ICD-10-CM

## 2024-05-06 DIAGNOSIS — L81.4 OTHER MELANIN HYPERPIGMENTATION: ICD-10-CM

## 2024-05-06 DIAGNOSIS — L71.8 OTHER ROSACEA: ICD-10-CM

## 2024-05-06 DIAGNOSIS — L57.8 OTHER SKIN CHANGES DUE TO CHRONIC EXPOSURE TO NONIONIZING RADIATION: ICD-10-CM

## 2024-05-06 PROBLEM — D22.5 MELANOCYTIC NEVI OF TRUNK: Status: ACTIVE | Noted: 2024-05-06

## 2024-05-06 PROBLEM — D18.01 HEMANGIOMA OF SKIN AND SUBCUTANEOUS TISSUE: Status: ACTIVE | Noted: 2024-05-06

## 2024-05-06 PROCEDURE — 99213 OFFICE O/P EST LOW 20 MIN: CPT

## 2024-05-06 PROCEDURE — OTHER PRESCRIPTION: OTHER

## 2024-05-06 PROCEDURE — OTHER MIPS QUALITY: OTHER

## 2024-05-06 PROCEDURE — OTHER COUNSELING: OTHER

## 2024-05-06 RX ORDER — SULFACETAMIDE SODIUM 100 MG/ML
LOTION TOPICAL
Qty: 118 | Refills: 3 | Status: ERX | COMMUNITY
Start: 2024-05-06

## 2024-05-06 ASSESSMENT — LOCATION SIMPLE DESCRIPTION DERM: LOCATION SIMPLE: LEFT UPPER BACK

## 2024-05-06 ASSESSMENT — LOCATION DETAILED DESCRIPTION DERM
LOCATION DETAILED: LEFT MEDIAL UPPER BACK
LOCATION DETAILED: LEFT SUPERIOR MEDIAL UPPER BACK

## 2024-05-06 ASSESSMENT — LOCATION ZONE DERM: LOCATION ZONE: TRUNK

## 2024-05-06 NOTE — HPI: FULL BODY SKIN EXAMINATION
What Type Of Note Output Would You Prefer (Optional)?: Standard Output
What Is The Reason For Today's Visit?: Full Body Skin Examination
What Is The Reason For Today's Visit? (Being Monitored For X): concerning skin lesions on an annual basis
Additional History: Pt has a history of cancer and was recommended to do annual skin checks

## 2024-06-08 ENCOUNTER — HEALTH MAINTENANCE LETTER (OUTPATIENT)
Age: 39
End: 2024-06-08

## 2024-07-15 ENCOUNTER — OFFICE VISIT (OUTPATIENT)
Dept: CARDIOLOGY | Facility: CLINIC | Age: 39
End: 2024-07-15
Attending: NURSE PRACTITIONER
Payer: COMMERCIAL

## 2024-07-15 VITALS
HEART RATE: 76 BPM | WEIGHT: 182.5 LBS | SYSTOLIC BLOOD PRESSURE: 129 MMHG | DIASTOLIC BLOOD PRESSURE: 90 MMHG | OXYGEN SATURATION: 100 % | BODY MASS INDEX: 26.07 KG/M2

## 2024-07-15 DIAGNOSIS — Z85.6 HISTORY OF ACUTE LYMPHOID LEUKEMIA: ICD-10-CM

## 2024-07-15 DIAGNOSIS — Z94.81 STATUS POST BONE MARROW TRANSPLANT (H): ICD-10-CM

## 2024-07-15 DIAGNOSIS — I10 BENIGN ESSENTIAL HYPERTENSION: Primary | ICD-10-CM

## 2024-07-15 DIAGNOSIS — I35.9 AORTIC VALVE CALCIFICATION: ICD-10-CM

## 2024-07-15 PROCEDURE — 93005 ELECTROCARDIOGRAM TRACING: CPT

## 2024-07-15 PROCEDURE — 99204 OFFICE O/P NEW MOD 45 MIN: CPT | Performed by: INTERNAL MEDICINE

## 2024-07-15 PROCEDURE — 99213 OFFICE O/P EST LOW 20 MIN: CPT | Performed by: INTERNAL MEDICINE

## 2024-07-15 PROCEDURE — 93010 ELECTROCARDIOGRAM REPORT: CPT | Performed by: INTERNAL MEDICINE

## 2024-07-15 RX ORDER — LOSARTAN POTASSIUM 25 MG/1
25 TABLET ORAL DAILY
Qty: 90 TABLET | Refills: 3 | Status: SHIPPED | OUTPATIENT
Start: 2024-07-15

## 2024-07-15 ASSESSMENT — PAIN SCALES - GENERAL: PAINLEVEL: NO PAIN (0)

## 2024-07-15 NOTE — LETTER
7/15/2024      RE: Gus Guerrero  [address]  [facility] [address]       Dear Colleague,    Thank you for the opportunity to participate in the care of your patient, Gus Guerrero, at the [facility] [place] [facility]. Please see a copy of my visit note below.    History:    Gus Guerrero is a very pleasant 39 year old man with prior history of ALL who is referred for evaluation of aortic valve calcification.     He was diagnosed with acute lymphoblastic leukemia in 1999 at approximately 14 years of age. He was treated at the [facility] and completed his initial therapy on May 6, 2002. Recurrence of his disease approximately two years after therapy prompted further therapy with a bone marrow transplantation.     He underwent a routine surveillance echo in April 2024 which was reported as low-normal left ventricular function with moderate aortic calcification and no stenosis.     Patient has no prior history of valve dysfunction. He has not received chest radiation. His blood pressures have been running high. He admits to a sedentary lifestyle. There is a family history of premature CAD in his father and maternal grand father. He underwent a CT coronary calcium score which was zero in 2022. He denies chest pain,  orthopnea, paroxysmal nocturnal dyspnea, palpitations or syncope.      Current Outpatient Medications   Medication Sig Dispense Refill     Ascorbic Acid (VITAMIN C) 500 MG CAPS        aspirin 81 MG EC tablet Take 81 mg by mouth daily       citalopram (CELEXA) 20 MG tablet Take 20 mg by mouth daily.       losartan (COZAAR) 25 MG tablet Take 1 tablet (25 mg) by mouth daily 90 tablet 3     rosuvastatin (CRESTOR) 5 MG tablet Take 5 mg by mouth daily       VITAMIN D 2000 UNIT PO TABS 1 TABLET DAILY         Allergies - reviewed     Allergies   Allergen Reactions     Bactrim      Biaxin [Clarithromycin]      Dapsone      Nuts       Serevent        Past history -reviewed  Past Medical History:   Diagnosis Date     ALL (acute lymphoblastic leukaemia)      Osteonecrosis (H)         Social history - reviewed  Social History     Tobacco Use     Smoking status: Never     Smokeless tobacco: Never   Substance Use Topics     Alcohol use: No     Drug use: No       Family history -reviewed  Family History   Problem Relation Age of Onset     Coronary Artery Disease Father      Hypertension Father      Hyperlipidemia Father      Heart Disease Father         MI     Hypertension Maternal Grandmother      Hypertension Maternal Grandfather      Heart Disease Maternal Grandfather         MI     Coronary Artery Disease Maternal Grandfather      Hypertension Paternal Grandmother      Diabetes Paternal Grandmother      Ovarian Cancer Paternal Grandmother      Arrhythmia Paternal Grandmother      Heart Disease Paternal Grandfather         heart palpitation     Basal cell carcinoma Paternal Grandfather      Hypertension Paternal Grandfather      Hypertension Brother      Leukemia Cousin      Basal cell carcinoma Paternal Aunt      Father with MI at 40, CAB in 60s  M GF with CAD at 42    ROS: non contributory on the 10-point review of system    Exam:     BP (!) 129/90 (BP Location: Right arm, Patient Position: Chair, Cuff Size: Adult Regular)   Pulse 76   Wt 82.8 kg (182 lb 8 oz)   SpO2 100%   BMI 26.07 kg/m    In general, the patient is in no apparent distress.      HEENT: Sclerae white, not injected.    Neck: No JVD. No thyromegaly  Heart: RRR. Normal S1, S2. No murmur, rub, click, or gallop.    Lungs: Clear bilaterally.  No rhonchi, wheezes, rales.   Extremities: No edema.  The pulses are 2+at the radial bilaterally.      I have independently reviewed this patient's relevant laboratory and cardiac data :    Recent Labs   Lab Test 04/05/24  1220   CHOL 213*   HDL 48   *   TRIG 228*      Recent Labs   Lab Test 04/05/24  1220 08/27/19  0924   AST 22 16  "    Recent Labs   Lab Test 04/05/24  1220 08/27/19  0924   ALT 30 29     Recent Labs   Lab Test 04/05/24  1220 08/27/19  0924    139   POTASSIUM 4.1 3.6   CHLORIDE 104 107   CO2 25 25   ANIONGAP 12 7   BUN 22.2* 20   CR 1.16 1.16     Invalid input(s): \"HEMATOCRIT\"  Recent Labs   Lab Test 04/05/24  1220 03/04/22  1238   A1C 5.7* 5.5     Recent Labs   Lab Test 04/05/24  1220 03/04/22  1238   TSH 2.65 2.94       Echo: 4/5/24 - personally interpreted  Low-normal left ventricular function, Biplane LVEF is 54%.  Right ventricular size and function are normal.  Mild trileaflet aortic valve calcification is present, no evidence of stenosis.  Mean normal RA pressure of 3 mmHg.  No pericardial effusion is present.    7/2022  CT CAC = 0    Assessment and Plan:  39 year old patient with    Essential hypertension  Low-normal left ventricular function   CAC score = 0 (2022)  History of ALL in 2012, post BMT  Premature coronary artery disease in the family    Gus is without cardiac symptoms concerning for angina or heart failure.  Continue statin patient is euvolemic on exam today. His blood pressure is mildly elevated today.  ECG today shows sinus rhythm today. Patient has low-normal left ventricular function with mild trileaflet aortic valve calcification. There is no aortic stenosis or regurgitation.     I would recommend continued surveillance for the mild cardiomyopathy and aortic valve calcification.  It is reassuring that he does not have any coronary artery calcification we also addressed his blood pressure today and he is in agreement to start low-dose losartan hypertension and for the cardiomyopathy. He is on low-dose Crestor for the mild dyslipidemia and the family history of premature coronary disease.  No activity or lifting restrictions advised at this point.    Recommendations:   Follow-up with PCP for annual lipids and blood pressure assessment.  Follow-up with us 2 years with an echocardiogram for " cardiomyopathy and aortic valve calcification.      Minal Persaud MD, MS  Professor of Medicine  Cardiovascular Medicine

## 2024-07-15 NOTE — PATIENT INSTRUCTIONS
You were seen in the Cardiology Clinic today by: Dr. Persaud    Plan:   Medication Changes:   START Losartan 25 mg once daily     Follow up Visit:  With your primary care provider annually to assess blood pressure and cholesterol labs.   With Dr. Persaud in 2 years with repeat echocardiogram prior to appointment.     Further Instructions:  Follow the American Heart Association Diet and Lifestyle recommendations: Limit saturated fat, trans fat, sodium, red meat, sweets and sugar-sweetened beverages. If you choose to eat red meat, compare labels and select the leanest cuts available. Aim for at least 150 minutes of moderate physical activity or 75 minutes each week.    If you have further questions, please utilize [employer] to contact us.     Your Care Team:    Cardiology   Telephone Number     Nurse Line  Suresh Brown RN    [phone]     For scheduling appointments:  [phone]           On-call cardiologist for after hours or on weekends:   [phone], option #4, and ask to speak to the on-call cardiologist.     Cardiovascular Clinic:   [address] [place] [address]. [place] [address]      As always, Thank you for trusting us with your health care needs!

## 2024-07-15 NOTE — NURSING NOTE
Chief Complaint   Patient presents with    New Patient     NEW PREVENTATIVE     Vitals were taken, medications reconciled, and EKG was performed.    Mahesh Sneed, EMT  11:20 AM

## 2024-07-15 NOTE — PROGRESS NOTES
History:    Gus Guerrero is a very pleasant 39 year old man with prior history of ALL who is referred for evaluation of aortic valve calcification.     He was diagnosed with acute lymphoblastic leukemia in 1999 at approximately 14 years of age. He was treated at the [facility] and completed his initial therapy on May 6, 2002. Recurrence of his disease approximately two years after therapy prompted further therapy with a bone marrow transplantation.     He underwent a routine surveillance echo in April 2024 which was reported as low-normal left ventricular function with moderate aortic calcification and no stenosis.     Patient has no prior history of valve dysfunction. He has not received chest radiation. His blood pressures have been running high. He admits to a sedentary lifestyle. There is a family history of premature CAD in his father and maternal grand father. He underwent a CT coronary calcium score which was zero in 2022. He denies chest pain,  orthopnea, paroxysmal nocturnal dyspnea, palpitations or syncope.      Current Outpatient Medications   Medication Sig Dispense Refill    Ascorbic Acid (VITAMIN C) 500 MG CAPS       aspirin 81 MG EC tablet Take 81 mg by mouth daily      citalopram (CELEXA) 20 MG tablet Take 20 mg by mouth daily.      losartan (COZAAR) 25 MG tablet Take 1 tablet (25 mg) by mouth daily 90 tablet 3    rosuvastatin (CRESTOR) 5 MG tablet Take 5 mg by mouth daily      VITAMIN D 2000 UNIT PO TABS 1 TABLET DAILY         Allergies - reviewed     Allergies   Allergen Reactions    Bactrim     Biaxin [Clarithromycin]     Dapsone     Nuts     Serevent        Past history -reviewed  Past Medical History:   Diagnosis Date    ALL (acute lymphoblastic leukaemia)     Osteonecrosis (H)         Social history - reviewed  Social History     Tobacco Use    Smoking status: Never    Smokeless tobacco: Never   Substance Use Topics    Alcohol use: No    Drug use: No       Family history  "-reviewed  Family History   Problem Relation Age of Onset    Coronary Artery Disease Father     Hypertension Father     Hyperlipidemia Father     Heart Disease Father         MI    Hypertension Maternal Grandmother     Hypertension Maternal Grandfather     Heart Disease Maternal Grandfather         MI    Coronary Artery Disease Maternal Grandfather     Hypertension Paternal Grandmother     Diabetes Paternal Grandmother     Ovarian Cancer Paternal Grandmother     Arrhythmia Paternal Grandmother     Heart Disease Paternal Grandfather         heart palpitation    Basal cell carcinoma Paternal Grandfather     Hypertension Paternal Grandfather     Hypertension Brother     Leukemia Cousin     Basal cell carcinoma Paternal Aunt      Father with MI at 40, CAB in 60s  M GF with CAD at 42    ROS: non contributory on the 10-point review of system    Exam:     BP (!) 129/90 (BP Location: Right arm, Patient Position: Chair, Cuff Size: Adult Regular)   Pulse 76   Wt 82.8 kg (182 lb 8 oz)   SpO2 100%   BMI 26.07 kg/m    In general, the patient is in no apparent distress.      HEENT: Sclerae white, not injected.    Neck: No JVD. No thyromegaly  Heart: RRR. Normal S1, S2. No murmur, rub, click, or gallop.    Lungs: Clear bilaterally.  No rhonchi, wheezes, rales.   Extremities: No edema.  The pulses are 2+at the radial bilaterally.      I have independently reviewed this patient's relevant laboratory and cardiac data :    Recent Labs   Lab Test 04/05/24  1220   CHOL 213*   HDL 48   *   TRIG 228*      Recent Labs   Lab Test 04/05/24  1220 08/27/19  0924   AST 22 16     Recent Labs   Lab Test 04/05/24  1220 08/27/19  0924   ALT 30 29     Recent Labs   Lab Test 04/05/24  1220 08/27/19  0924    139   POTASSIUM 4.1 3.6   CHLORIDE 104 107   CO2 25 25   ANIONGAP 12 7   BUN 22.2* 20   CR 1.16 1.16     Invalid input(s): \"HEMATOCRIT\"  Recent Labs   Lab Test 04/05/24  1220 03/04/22  1238   A1C 5.7* 5.5     Recent Labs   Lab " Test 04/05/24  1220 03/04/22  1238   TSH 2.65 2.94       Echo: 4/5/24 - personally interpreted  Low-normal left ventricular function, Biplane LVEF is 54%.  Right ventricular size and function are normal.  Mild trileaflet aortic valve calcification is present, no evidence of stenosis.  Mean normal RA pressure of 3 mmHg.  No pericardial effusion is present.    7/2022  CT CAC = 0    Assessment and Plan:  39 year old patient with    Essential hypertension  Low-normal left ventricular function   CAC score = 0 (2022)  History of ALL in 2012, post BMT  Premature coronary artery disease in the family    Gus is without cardiac symptoms concerning for angina or heart failure.  Continue statin patient is euvolemic on exam today. His blood pressure is mildly elevated today.  ECG today shows sinus rhythm today. Patient has low-normal left ventricular function with mild trileaflet aortic valve calcification. There is no aortic stenosis or regurgitation.     I would recommend continued surveillance for the mild cardiomyopathy and aortic valve calcification.  It is reassuring that he does not have any coronary artery calcification we also addressed his blood pressure today and he is in agreement to start low-dose losartan hypertension and for the cardiomyopathy. He is on low-dose Crestor for the mild dyslipidemia and the family history of premature coronary disease.  No activity or lifting restrictions advised at this point.    Recommendations:   Follow-up with PCP for annual lipids and blood pressure assessment.  Follow-up with us 2 years with an echocardiogram for cardiomyopathy and aortic valve calcification.      Minal Persaud MD, MS  Professor of Medicine  Cardiovascular Medicine

## 2024-07-16 LAB
ATRIAL RATE - MUSE: 67 BPM
DIASTOLIC BLOOD PRESSURE - MUSE: NORMAL MMHG
INTERPRETATION ECG - MUSE: NORMAL
P AXIS - MUSE: 59 DEGREES
PR INTERVAL - MUSE: 166 MS
QRS DURATION - MUSE: 78 MS
QT - MUSE: 392 MS
QTC - MUSE: 414 MS
R AXIS - MUSE: 31 DEGREES
SYSTOLIC BLOOD PRESSURE - MUSE: NORMAL MMHG
T AXIS - MUSE: 46 DEGREES
VENTRICULAR RATE- MUSE: 67 BPM

## 2025-07-02 DIAGNOSIS — I35.9 AORTIC VALVE CALCIFICATION: ICD-10-CM

## 2025-07-02 RX ORDER — LOSARTAN POTASSIUM 25 MG/1
25 TABLET ORAL DAILY
Qty: 90 TABLET | Refills: 3 | Status: SHIPPED | OUTPATIENT
Start: 2025-07-02

## 2025-07-02 NOTE — TELEPHONE ENCOUNTER
Last Written Prescription:  losartan (COZAAR) 25 MG tablet 90 tablet 3 7/15/2024 -- No   Sig - Route: Take 1 tablet (25 mg) by mouth daily - Oral   Sent to pharmacy as: Losartan Potassium 25 MG Oral Tablet (COZAAR)   Class: E-Prescribe   Order: [id number]     ----------------------  Last Visit Date: 7/15/24  Future Visit Date: 0  ----------------------      Refill decision:   [x] Medication unable to be refilled by RN due to: Overdue labs/test:      Last Comprehensive Metabolic Panel:  Lab Results   Component Value Date     04/05/2024    POTASSIUM 4.1 04/05/2024    CHLORIDE 104 04/05/2024    CO2 25 04/05/2024    ANIONGAP 12 04/05/2024    GLC 86 04/05/2024    BUN 22.2 (H) 04/05/2024    CR 1.16 04/05/2024    GFRESTIMATED 83 04/05/2024    ALICE 10.1 (H) 04/05/2024           Request from pharmacy:  Requested Prescriptions   Pending Prescriptions Disp Refills    losartan (COZAAR) 25 MG tablet [Pharmacy Med Name: LOSARTAN POTASSIUM 25 MG TAB] 30 tablet 35     Sig: TAKE 1 TABLET BY MOUTH EVERY DAY       Angiotensin-II Receptors Failed - 7/2/2025 11:59 AM        Failed - Most recent blood pressure under 140/90 in past 12 months     BP Readings from Last 3 Encounters:   07/15/24 (!) 129/90   04/05/24 (!) 154/102   03/04/22 (!) 166/102       No data recorded            Failed - Has GFR on file in past 12 months and most recent value is normal        Failed - Medication indicated for associated diagnosis     The medication is prescribed for one or more of the following conditions:    Chronic Kidney Disease (CDK)    Heart Failure (HF)    Diabetes, Nephropathy   Hypertension    Coronary Artery Disease (CAD)   Raynaud's Disease   Ischemic cardiomyopathy   Cardiomyopathy   Pulmonary Hypertension          Failed - Normal serum potassium on file in past 12 months     Recent Labs   Lab Test 04/05/24  1220   POTASSIUM 4.1                    Passed - Medication is active on med list and the sig matches. RN to manually verify dose  and sig if red X/fail.     If the protocol passes (green check), you do not need to verify med dose and sig.    A prescription matches if they are the same clinical intention.    For Example: once daily and every morning are the same.    The protocol can not identify upper and lower case letters as matching and will fail.     For Example: Take 1 tablet (50 mg) by mouth daily     TAKE 1 TABLET (50 MG) BY MOUTH DAILY    For all fails (red x), verify dose and sig.    If the refill does match what is on file, the RN can still proceed to approve the refill request.       If they do not match, route to the appropriate provider.             Passed - Recent (12 month) or future (90 days) visit with authorizing provider's specialty (provided they have been seen in the past 15 months)     The patient must have completed an in-person or virtual visit within the past 12 months or has a future visit scheduled within the next 90 days with the authorizing provider s specialty.  Urgent care and e-visits do not qualify as an office visit for this protocol.          Passed - Patient is age 18 or older